# Patient Record
Sex: FEMALE | Race: WHITE | Employment: PART TIME | URBAN - METROPOLITAN AREA
[De-identification: names, ages, dates, MRNs, and addresses within clinical notes are randomized per-mention and may not be internally consistent; named-entity substitution may affect disease eponyms.]

---

## 2017-04-13 ENCOUNTER — OFFICE VISIT (OUTPATIENT)
Dept: FAMILY MEDICINE | Facility: CLINIC | Age: 62
End: 2017-04-13
Payer: COMMERCIAL

## 2017-04-13 VITALS
HEIGHT: 66 IN | SYSTOLIC BLOOD PRESSURE: 138 MMHG | TEMPERATURE: 98.8 F | WEIGHT: 127.2 LBS | BODY MASS INDEX: 20.44 KG/M2 | DIASTOLIC BLOOD PRESSURE: 88 MMHG | HEART RATE: 64 BPM

## 2017-04-13 DIAGNOSIS — Z11.59 NEED FOR HEPATITIS C SCREENING TEST: ICD-10-CM

## 2017-04-13 DIAGNOSIS — Z00.00 ROUTINE GENERAL MEDICAL EXAMINATION AT A HEALTH CARE FACILITY: Primary | ICD-10-CM

## 2017-04-13 DIAGNOSIS — Z12.11 COLON CANCER SCREENING: ICD-10-CM

## 2017-04-13 DIAGNOSIS — Z12.31 ENCOUNTER FOR SCREENING MAMMOGRAM FOR BREAST CANCER: ICD-10-CM

## 2017-04-13 DIAGNOSIS — R03.0 ELEVATED BLOOD PRESSURE READING WITHOUT DIAGNOSIS OF HYPERTENSION: ICD-10-CM

## 2017-04-13 LAB
ALBUMIN SERPL-MCNC: 4.1 G/DL (ref 3.4–5)
ALP SERPL-CCNC: 65 U/L (ref 40–150)
ALT SERPL W P-5'-P-CCNC: 19 U/L (ref 0–50)
ANION GAP SERPL CALCULATED.3IONS-SCNC: 7 MMOL/L (ref 3–14)
AST SERPL W P-5'-P-CCNC: 17 U/L (ref 0–45)
BILIRUB SERPL-MCNC: 0.5 MG/DL (ref 0.2–1.3)
BUN SERPL-MCNC: 17 MG/DL (ref 7–30)
CALCIUM SERPL-MCNC: 9 MG/DL (ref 8.5–10.1)
CHLORIDE SERPL-SCNC: 101 MMOL/L (ref 94–109)
CHOLEST SERPL-MCNC: 171 MG/DL
CO2 SERPL-SCNC: 30 MMOL/L (ref 20–32)
CREAT SERPL-MCNC: 0.85 MG/DL (ref 0.52–1.04)
ERYTHROCYTE [DISTWIDTH] IN BLOOD BY AUTOMATED COUNT: 12.4 % (ref 10–15)
GFR SERPL CREATININE-BSD FRML MDRD: 67 ML/MIN/1.7M2
GLUCOSE SERPL-MCNC: 101 MG/DL (ref 70–99)
HCT VFR BLD AUTO: 44.8 % (ref 35–47)
HDLC SERPL-MCNC: 88 MG/DL
HGB BLD-MCNC: 14.5 G/DL (ref 11.7–15.7)
LDLC SERPL CALC-MCNC: 68 MG/DL
MCH RBC QN AUTO: 30.3 PG (ref 26.5–33)
MCHC RBC AUTO-ENTMCNC: 32.4 G/DL (ref 31.5–36.5)
MCV RBC AUTO: 94 FL (ref 78–100)
NONHDLC SERPL-MCNC: 83 MG/DL
PLATELET # BLD AUTO: 259 10E9/L (ref 150–450)
POTASSIUM SERPL-SCNC: 4.1 MMOL/L (ref 3.4–5.3)
PROT SERPL-MCNC: 7.5 G/DL (ref 6.8–8.8)
RBC # BLD AUTO: 4.79 10E12/L (ref 3.8–5.2)
SODIUM SERPL-SCNC: 138 MMOL/L (ref 133–144)
TRIGL SERPL-MCNC: 75 MG/DL
TSH SERPL DL<=0.005 MIU/L-ACNC: 1.27 MU/L (ref 0.4–4)
WBC # BLD AUTO: 5.8 10E9/L (ref 4–11)

## 2017-04-13 PROCEDURE — 99386 PREV VISIT NEW AGE 40-64: CPT | Performed by: FAMILY MEDICINE

## 2017-04-13 PROCEDURE — 80053 COMPREHEN METABOLIC PANEL: CPT | Performed by: FAMILY MEDICINE

## 2017-04-13 PROCEDURE — 86803 HEPATITIS C AB TEST: CPT | Performed by: FAMILY MEDICINE

## 2017-04-13 PROCEDURE — 80061 LIPID PANEL: CPT | Performed by: FAMILY MEDICINE

## 2017-04-13 PROCEDURE — 36415 COLL VENOUS BLD VENIPUNCTURE: CPT | Performed by: FAMILY MEDICINE

## 2017-04-13 PROCEDURE — 85027 COMPLETE CBC AUTOMATED: CPT | Performed by: FAMILY MEDICINE

## 2017-04-13 PROCEDURE — 84443 ASSAY THYROID STIM HORMONE: CPT | Performed by: FAMILY MEDICINE

## 2017-04-13 NOTE — NURSING NOTE
"Chief Complaint   Patient presents with     Physical     Patient is fasting for labs today.        Initial BP (!) 142/96 (BP Location: Right arm, Patient Position: Chair, Cuff Size: Adult Regular)  Pulse 64  Temp 98.8  F (37.1  C) (Tympanic)  Ht 5' 6\" (1.676 m)  Wt 127 lb 3.2 oz (57.7 kg)  BMI 20.53 kg/m2 Estimated body mass index is 20.53 kg/(m^2) as calculated from the following:    Height as of this encounter: 5' 6\" (1.676 m).    Weight as of this encounter: 127 lb 3.2 oz (57.7 kg).  Medication Reconciliation: complete   Natalee Luna LPN  "

## 2017-04-13 NOTE — PROGRESS NOTES
SUBJECTIVE:     CC: Malorie Gill is an 62 year old woman who presents for preventive health visit.     Physical   Annual:     Getting at least 3 servings of Calcium per day::  Yes    Bi-annual eye exam::  Yes    Dental care twice a year::  Yes    Sleep apnea or symptoms of sleep apnea::  Daytime drowsiness    Diet::  Regular (no restrictions)    Frequency of exercise::  2-3 days/week    Duration of exercise::  15-30 minutes    Taking medications regularly::  Not Applicable    Medication side effects::  Not applicable    Concerns:  * Fasting labs    Today's PHQ-2 Score:   PHQ-2 ( 1999 [employer]) 4/13/2017   Q1: Little interest or pleasure in doing things 0   Q2: Feeling down, depressed or hopeless 0   PHQ-2 Score 0   Little interest or pleasure in doing things -   Feeling down, depressed or hopeless -   PHQ-2 Score -     Abuse: Current or Past(Physical, Sexual or Emotional)- No  Do you feel safe in your environment - Yes    Social History   Substance Use Topics     Smoking status: Never Smoker     Smokeless tobacco: Never Used     Alcohol use Yes      Comment: rare; 2-3 drinks per month or less     The patient does not drink >3 drinks per day nor >7 drinks per week.    Recent Labs   Lab Test  12/01/11   0831  03/09/09   1140   CHOL  158  153   HDL  57  70   LDL  85  65   TRIG  79  88   CHOLHDLRATIO  3.0  2.0       Reviewed orders with patient.  Reviewed health maintenance and updated orders accordingly - Yes    Mammo Decision Support:  Patient over age 50, mutual decision to screen reflected in health maintenance.    Pertinent mammograms are reviewed under the imaging tab.  History of abnormal Pap smear: NO - age 30-65 PAP every 5 years with negative HPV co-testing recommended    Reviewed and updated as needed this visit by clinical staff  Tobacco  Allergies  Meds  Med Hx  Surg Hx  Fam Hx  Soc Hx        Reviewed and updated as needed this visit by Provider          ROS:  C: NEGATIVE for fever, chills,  "change in weight  I: NEGATIVE for worrisome rashes, moles or lesions  E: NEGATIVE for vision changes or irritation  ENT: NEGATIVE for ear, mouth and throat problems  R: NEGATIVE for significant cough or SOB  B: NEGATIVE for masses, tenderness or discharge  CV: NEGATIVE for chest pain, palpitations or peripheral edema  GI: NEGATIVE for nausea, abdominal pain, heartburn, or change in bowel habits  : NEGATIVE for unusual urinary or vaginal symptoms. No vaginal bleeding.  M: NEGATIVE for significant arthralgias or myalgia  N: NEGATIVE for weakness, dizziness or paresthesias  P: NEGATIVE for changes in mood or affect     Problem list, Medication list, Allergies, and Medical/Social/Surgical histories reviewed in [facility] and updated as appropriate.  OBJECTIVE:     BP (!) 142/96 (BP Location: Right arm, Patient Position: Chair, Cuff Size: Adult Regular)  Pulse 64  Temp 98.8  F (37.1  C) (Tympanic)  Ht 5' 6\" (1.676 m)  Wt 127 lb 3.2 oz (57.7 kg)  BMI 20.53 kg/m2     EXAM:  GENERAL: healthy, alert and no distress  NECK: no adenopathy, no asymmetry, masses, or scars and thyroid normal to palpation  RESP: lungs clear to auscultation - no rales, rhonchi or wheezes  CV: regular rate and rhythm, normal S1 S2, no S3 or S4, no murmur, click or rub, no peripheral edema and peripheral pulses strong  ABDOMEN: soft, nontender, no hepatosplenomegaly, no masses and bowel sounds normal  MS: no gross musculoskeletal defects noted, no edema    ASSESSMENT/PLAN:     1. Routine general medical examination at a health care facility    - Hepatitis C Screen Reflex to HCV RNA Quant and Genotype  - GASTROENTEROLOGY ADULT REF PROCEDURE ONLY  - LIPID REFLEX TO DIRECT LDL PANEL  - TSH with free T4 reflex  - Comprehensive metabolic panel  - CBC with platelets  - MA Screen Bilateral w/Earnest; Future    2. Colon cancer screening    - GASTROENTEROLOGY ADULT REF PROCEDURE ONLY    3. Need for hepatitis C screening test  - Hepatitis C Screen Reflex to HCV " "RNA Quant and Genotype    4. Elevated blood pressure reading without diagnosis of hypertension    - TSH with free T4 reflex  - Comprehensive metabolic panel  - CBC with platelets    5. Encounter for screening mammogram for breast cancer  - MA Screen Bilateral w/Earnest; Future    COUNSELING:  Reviewed preventive health counseling, as reflected in patient instructions       Regular exercise       Healthy diet/nutrition       Vision screening       Hearing screening       Colon cancer screening       Consider Hep C screening for patients born between 1945 and 1965       Advance Care Planning     reports that she has never smoked. She has never used smokeless tobacco.    Estimated body mass index is 20.53 kg/(m^2) as calculated from the following:    Height as of this encounter: 5' 6\" (1.676 m).    Weight as of this encounter: 127 lb 3.2 oz (57.7 kg).       Counseling Resources:  ATP IV Guidelines  Pooled Cohorts Equation Calculator  Breast Cancer Risk Calculator  FRAX Risk Assessment  ICSI Preventive Guidelines  Dietary Guidelines for Americans, 2010  USDA's MyPlate  ASA Prophylaxis  Lung CA Screening    Jeramy Conti MD  [facility]  "

## 2017-04-13 NOTE — MR AVS SNAPSHOT
After Visit Summary   4/13/2017    Malorie Gill    MRN: [id number]           Patient Information     Date Of Birth          1955        Visit Information        Provider Department      4/13/2017 10:00 AM [facility]        Today's Diagnoses     Routine general medical examination at a health care facility    -  1    Colon cancer screening        Need for hepatitis C screening test        Elevated blood pressure reading without diagnosis of hypertension        Encounter for screening mammogram for breast cancer          Care Instructions      Preventive Health Recommendations  Female Ages 50 - 64    Yearly exam: See your health care provider every year in order to  o Review health changes.   o Discuss preventive care.    o Review your medicines if your doctor has prescribed any.      Get a Pap test every three years (unless you have an abnormal result and your provider advises testing more often).    If you get Pap tests with HPV test, you only need to test every 5 years, unless you have an abnormal result.     You do not need a Pap test if your uterus was removed (hysterectomy) and you have not had cancer.    You should be tested each year for STDs (sexually transmitted diseases) if you're at risk.     Have a mammogram every 1 to 2 years.    Have a colonoscopy at age 50, or have a yearly FIT test (stool test). These exams screen for colon cancer.      Have a cholesterol test every 5 years, or more often if advised.    Have a diabetes test (fasting glucose) every three years. If you are at risk for diabetes, you should have this test more often.     If you are at risk for osteoporosis (brittle bone disease), think about having a bone density scan (DEXA).    Shots: Get a flu shot each year. Get a tetanus shot every 10 years.    Nutrition:     Eat at least 5 servings of fruits and vegetables each day.    Eat whole-grain bread, whole-wheat pasta and brown rice  instead of white grains and rice.    Talk to your provider about Calcium and Vitamin D.     Lifestyle    Exercise at least 150 minutes a week (30 minutes a day, 5 days a week). This will help you control your weight and prevent disease.    Limit alcohol to one drink per day.    No smoking.     Wear sunscreen to prevent skin cancer.     See your dentist every six months for an exam and cleaning.    See your eye doctor every 1 to 2 years.          Follow-ups after your visit        Additional Services     GASTROENTEROLOGY ADULT REF PROCEDURE ONLY                 Your next 10 appointments already scheduled     Apr 28, 2017   Procedure with David Garcia MD   [facility] ([facility])    [address] [address]   [phone]           The medical center is located at [address] (between [address] in [place], [place] miles north of [place]).              Who to contact     Normal or non-critical lab and imaging results will be communicated to you by [employer], letter or phone within 4 business days after the clinic has received the results. If you do not hear from us within 7 days, please contact the clinic through [employer] or phone. If you have a critical or abnormal lab result, we will notify you by phone as soon as possible.  Submit refill requests through [email or web address] or call your pharmacy and they will forward the refill request to us. Please allow 3 business days for your refill to be completed.          If you need to speak with a  for additional information , please call: [phone]             Additional Information About Your Visit        [email or web address] Information     [email or web address] gives you secure access to your electronic health record. If you see a primary care provider, you can also send messages to your care team and make appointments. If you have questions, please call your primary care clinic.  If you do not have a primary care provider,  "please call [phone] and they will assist you.        Care EveryWhere ID     This is your Care EveryWhere ID. This could be used by other organizations to access your [facility] medical records  [id number]        Your Vitals Were     Pulse Temperature Height BMI (Body Mass Index)          64 98.8  F (37.1  C) (Tympanic) 5' 6\" (1.676 m) 20.53 kg/m2         Blood Pressure from Last 3 Encounters:   04/13/17 138/88   12/18/13 130/90   12/09/13 139/84    Weight from Last 3 Encounters:   04/13/17 127 lb 3.2 oz (57.7 kg)   12/18/13 132 lb 6.4 oz (60.1 kg)   12/09/13 129 lb 4.8 oz (58.7 kg)              We Performed the Following     CBC with platelets     Comprehensive metabolic panel     GASTROENTEROLOGY ADULT REF PROCEDURE ONLY     Hepatitis C Screen Reflex to HCV RNA Quant and Genotype     LIPID REFLEX TO DIRECT LDL PANEL     TSH with free T4 reflex        Primary Care Provider Office Phone # Fax #    Jeramy Conti [phone]       [facility] [address] [address] [address]        Thank you!     Thank you for choosing [facility]  for your care. Our goal is always to provide you with excellent care. Hearing back from our patients is one way we can continue to improve our services. Please take a few minutes to complete the written survey that you may receive in the mail after your visit with us. Thank you!             Your Updated Medication List - Protect others around you: Learn how to safely use, store and throw away your medicines at www.disposemymeds.org.          This list is accurate as of: 4/13/17 11:59 PM.  Always use your most recent med list.                   Brand Name Dispense Instructions for use    NO ACTIVE MEDICATIONS      .         "

## 2017-04-14 LAB — HCV AB SERPL QL IA: NORMAL

## 2017-04-23 ENCOUNTER — ANESTHESIA EVENT (OUTPATIENT)
Dept: GASTROENTEROLOGY | Facility: CLINIC | Age: 62
End: 2017-04-23
Payer: COMMERCIAL

## 2017-04-23 NOTE — ANESTHESIA PREPROCEDURE EVALUATION
Anesthesia Evaluation     . Pt has had prior anesthetic. Type: General    No history of anesthetic complications          ROS/MED HX    ENT/Pulmonary:  - neg pulmonary ROS     Neurologic:  - neg neurologic ROS     Cardiovascular:  - neg cardiovascular ROS       METS/Exercise Tolerance:  >4 METS   Hematologic:  - neg hematologic  ROS       Musculoskeletal:  - neg musculoskeletal ROS       GI/Hepatic:  - neg GI/hepatic ROS       Renal/Genitourinary:  - ROS Renal section negative       Endo:  - neg endo ROS       Psychiatric:  - neg psychiatric ROS       Infectious Disease:  - neg infectious disease ROS       Malignancy:      - no malignancy   Other:    - neg other ROS                 Physical Exam  Normal systems: cardiovascular, pulmonary and dental    Airway   Mallampati: II  TM distance: >3 FB  Neck ROM: full    Dental     Cardiovascular   Rhythm and rate: regular and normal      Pulmonary    breath sounds clear to auscultation                    Anesthesia Plan      History & Physical Review  History and physical reviewed and following examination; no interval change.    ASA Status:  1 .    NPO Status:  > 8 hours    Plan for General with Propofol induction. Maintenance will be TIVA.           Postoperative Care      Consents  Anesthetic plan, risks, benefits and alternatives discussed with:  Patient..                          .

## 2017-04-28 ENCOUNTER — HOSPITAL ENCOUNTER (OUTPATIENT)
Facility: CLINIC | Age: 62
Discharge: HOME | End: 2017-04-28
Attending: SURGERY | Admitting: SURGERY
Payer: COMMERCIAL

## 2017-04-28 ENCOUNTER — ANESTHESIA (OUTPATIENT)
Dept: GASTROENTEROLOGY | Facility: CLINIC | Age: 62
End: 2017-04-28
Payer: COMMERCIAL

## 2017-04-28 ENCOUNTER — SURGERY (OUTPATIENT)
Age: 62
End: 2017-04-28

## 2017-04-28 VITALS
SYSTOLIC BLOOD PRESSURE: 124 MMHG | HEIGHT: 66 IN | WEIGHT: 127 LBS | RESPIRATION RATE: 16 BRPM | DIASTOLIC BLOOD PRESSURE: 82 MMHG | TEMPERATURE: 98.2 F | BODY MASS INDEX: 20.41 KG/M2 | OXYGEN SATURATION: 97 % | HEART RATE: 64 BPM

## 2017-04-28 LAB — COLONOSCOPY: NORMAL

## 2017-04-28 PROCEDURE — 88305 TISSUE EXAM BY PATHOLOGIST: CPT | Performed by: SURGERY

## 2017-04-28 PROCEDURE — 45385 COLONOSCOPY W/LESION REMOVAL: CPT | Performed by: SURGERY

## 2017-04-28 PROCEDURE — 37000008 ZZH ANESTHESIA TECHNICAL FEE, 1ST 30 MIN: Performed by: SURGERY

## 2017-04-28 PROCEDURE — 25000125 ZZHC RX 250: Performed by: SURGERY

## 2017-04-28 PROCEDURE — 25000125 ZZHC RX 250: Performed by: NURSE ANESTHETIST, CERTIFIED REGISTERED

## 2017-04-28 PROCEDURE — 25800025 ZZH RX 258: Performed by: SURGERY

## 2017-04-28 PROCEDURE — 88305 TISSUE EXAM BY PATHOLOGIST: CPT | Mod: 26 | Performed by: SURGERY

## 2017-04-28 PROCEDURE — 45385 COLONOSCOPY W/LESION REMOVAL: CPT | Mod: PT | Performed by: SURGERY

## 2017-04-28 RX ORDER — SODIUM CHLORIDE, SODIUM LACTATE, POTASSIUM CHLORIDE, CALCIUM CHLORIDE 600; 310; 30; 20 MG/100ML; MG/100ML; MG/100ML; MG/100ML
INJECTION, SOLUTION INTRAVENOUS CONTINUOUS
Status: DISCONTINUED | OUTPATIENT
Start: 2017-04-28 | End: 2017-04-28 | Stop reason: HOSPADM

## 2017-04-28 RX ORDER — PROPOFOL 10 MG/ML
INJECTION, EMULSION INTRAVENOUS CONTINUOUS PRN
Status: DISCONTINUED | OUTPATIENT
Start: 2017-04-28 | End: 2017-04-28

## 2017-04-28 RX ORDER — LIDOCAINE HYDROCHLORIDE 10 MG/ML
INJECTION, SOLUTION INFILTRATION; PERINEURAL PRN
Status: DISCONTINUED | OUTPATIENT
Start: 2017-04-28 | End: 2017-04-28

## 2017-04-28 RX ORDER — PROPOFOL 10 MG/ML
INJECTION, EMULSION INTRAVENOUS PRN
Status: DISCONTINUED | OUTPATIENT
Start: 2017-04-28 | End: 2017-04-28

## 2017-04-28 RX ORDER — LIDOCAINE 40 MG/G
CREAM TOPICAL
Status: DISCONTINUED | OUTPATIENT
Start: 2017-04-28 | End: 2017-04-28 | Stop reason: HOSPADM

## 2017-04-28 RX ORDER — ONDANSETRON 2 MG/ML
4 INJECTION INTRAMUSCULAR; INTRAVENOUS
Status: DISCONTINUED | OUTPATIENT
Start: 2017-04-28 | End: 2017-04-28 | Stop reason: HOSPADM

## 2017-04-28 RX ORDER — GLYCOPYRROLATE 0.2 MG/ML
INJECTION, SOLUTION INTRAMUSCULAR; INTRAVENOUS PRN
Status: DISCONTINUED | OUTPATIENT
Start: 2017-04-28 | End: 2017-04-28

## 2017-04-28 RX ADMIN — PROPOFOL 200 MCG/KG/MIN: 10 INJECTION, EMULSION INTRAVENOUS at 08:46

## 2017-04-28 RX ADMIN — PROPOFOL 30 MG: 10 INJECTION, EMULSION INTRAVENOUS at 08:59

## 2017-04-28 RX ADMIN — SODIUM CHLORIDE, POTASSIUM CHLORIDE, SODIUM LACTATE AND CALCIUM CHLORIDE: 600; 310; 30; 20 INJECTION, SOLUTION INTRAVENOUS at 07:51

## 2017-04-28 RX ADMIN — LIDOCAINE HYDROCHLORIDE 50 MG: 10 INJECTION, SOLUTION INFILTRATION; PERINEURAL at 08:47

## 2017-04-28 RX ADMIN — GLYCOPYRROLATE 0.2 MG: 0.2 INJECTION, SOLUTION INTRAMUSCULAR; INTRAVENOUS at 08:46

## 2017-04-28 RX ADMIN — PROPOFOL 40 MG: 10 INJECTION, EMULSION INTRAVENOUS at 08:47

## 2017-04-28 RX ADMIN — LIDOCAINE HYDROCHLORIDE 1 ML: 10 INJECTION, SOLUTION INFILTRATION; PERINEURAL at 07:51

## 2017-04-28 NOTE — ANESTHESIA POSTPROCEDURE EVALUATION
Patient: Malorie Gill    Procedure(s):  Colonoscopy with polypectomy     - Wound Class: II-Clean Contaminated    Diagnosis:screening  Diagnosis Additional Information: No value filed.    Anesthesia Type:  General    Note:  Anesthesia Post Evaluation    Patient location during evaluation: Phase 2  Patient participation: Able to fully participate in evaluation  Level of consciousness: awake and alert  Pain management: adequate  Airway patency: patent  Cardiovascular status: acceptable  Respiratory status: acceptable  Hydration status: acceptable  PONV: none     Anesthetic complications: None          Last vitals:  Vitals:    04/28/17 0733 04/28/17 0911   BP: 121/70 118/87   Pulse:  82   Resp: 16 16   Temp: 36.8  C (98.2  F)    SpO2: 99% 98%         Electronically Signed By: Yony Velez CRNA, APRN CRNA  April 28, 2017  9:21 AM

## 2017-04-28 NOTE — BRIEF OP NOTE
[facility]   Brief Operative Note    Pre-operative diagnosis: screening   Post-operative diagnosis mild diverticulosis, single polyp   Procedure: Procedure(s):  Colonoscopy with polypectomy     - Wound Class: II-Clean Contaminated   Surgeon(s): Surgeon(s) and Role:     * David Garcia MD - Primary   Estimated blood loss: * No values recorded between 4/28/2017 12:00 AM and 4/28/2017  9:10 AM *    Specimens:   ID Type Source Tests Collected by Time Destination   A :  Polyp Large Intestine, Hepatic Flexure SURGICAL PATHOLOGY EXAM David Garcia MD 4/28/2017  9:04 AM       Findings: 1.  Single polyp at hepatic flexure.  2.  Scattered diverticulosis  3.  Colon otherwise normal

## 2017-04-28 NOTE — H&P
62 year old year old female here for colonoscopy for screening.    Patient Active Problem List   Diagnosis     CARDIOVASCULAR SCREENING; LDL GOAL LESS THAN 160     Advanced directives, counseling/discussion     Health Care Home       Past Medical History:   Diagnosis Date     Adnexal mass 9/97    complex LEFT adnexal mass     Other and unspecified ovarian cyst        Past Surgical History:   Procedure Laterality Date     SALPINGO OOPHORECTOMY,R/L/SHANNON  2000    Laparoscopic LEFT salping-oophorectomy     TUBAL LIGATION  2000    RIGHT [facility]       [facility]    No current outpatient prescriptions on file.       No Known Allergies    Pt reports that she has never smoked. She has never used smokeless tobacco. She reports that she drinks alcohol. She reports that she does not use illicit drugs.    Exam:  There were no vitals taken for this visit.    Awake, Alert OX3  Lungs - CTA bilaterally  CV - RRR, no murmurs, distal pulses intact  Abd - soft, non-distended, non-tender, +BS  Extr - No cyanosis or edema    A/P 62 year old year old female in need of colonoscopy for screening. Risks, benefits, alternatives, and complications were discussed including the possibility of perforation and the patient agreed to proceed    David Garcia MD

## 2017-04-28 NOTE — ANESTHESIA CARE TRANSFER NOTE
Patient: Malorie Gill    Procedure(s):  Colonoscopy with polypectomy     - Wound Class: II-Clean Contaminated    Diagnosis: screening  Diagnosis Additional Information: No value filed.    Anesthesia Type:   General     Note:  Airway :Nasal Cannula  Patient transferred to:Phase II        Vitals: (Last set prior to Anesthesia Care Transfer)    CRNA VITALS  4/28/2017 0839 - 4/28/2017 0909      4/28/2017             Pulse: 85    SpO2: 99 %                Electronically Signed By: Yony Velez CRNA, APRN CRNA  April 28, 2017  9:09 AM

## 2017-05-01 LAB — COPATH REPORT: NORMAL

## 2017-07-08 ENCOUNTER — HEALTH MAINTENANCE LETTER (OUTPATIENT)
Age: 62
End: 2017-07-08

## 2017-09-08 ENCOUNTER — TELEPHONE (OUTPATIENT)
Dept: FAMILY MEDICINE | Facility: CLINIC | Age: 62
End: 2017-09-08

## 2017-09-08 NOTE — TELEPHONE ENCOUNTER
Panel Management Review      Patient has the following on her problem list: None      Composite cancer screening  Chart review shows that this patient is due/due soon for the following Pap Smear and Mammogram  Summary:    Patient is due/failing the following:   MAMMOGRAM, PAP and PHYSICAL    Action needed:   Patient needs office visit for a physical and Patient needs referral/order: mammogram    Type of outreach:    Phone, spoke to patient.  Denied at this time    Questions for provider review:    None                                                                                                                                    Amy Fletcher CMA     Chart routed to self.

## 2017-09-21 ENCOUNTER — HOSPITAL ENCOUNTER (EMERGENCY)
Facility: CLINIC | Age: 62
End: 2017-09-21
Payer: COMMERCIAL

## 2017-09-21 ENCOUNTER — APPOINTMENT (OUTPATIENT)
Dept: GENERAL RADIOLOGY | Facility: CLINIC | Age: 62
End: 2017-09-21
Attending: PHYSICIAN ASSISTANT
Payer: OTHER MISCELLANEOUS

## 2017-09-21 ENCOUNTER — HOSPITAL ENCOUNTER (EMERGENCY)
Facility: CLINIC | Age: 62
Discharge: HOME | End: 2017-09-21
Attending: PHYSICIAN ASSISTANT | Admitting: PHYSICIAN ASSISTANT
Payer: OTHER MISCELLANEOUS

## 2017-09-21 VITALS
DIASTOLIC BLOOD PRESSURE: 76 MMHG | SYSTOLIC BLOOD PRESSURE: 118 MMHG | WEIGHT: 130 LBS | HEIGHT: 66 IN | OXYGEN SATURATION: 99 % | HEART RATE: 82 BPM | TEMPERATURE: 98.3 F | RESPIRATION RATE: 20 BRPM | BODY MASS INDEX: 20.89 KG/M2

## 2017-09-21 DIAGNOSIS — M25.512 ACUTE PAIN OF LEFT SHOULDER: ICD-10-CM

## 2017-09-21 DIAGNOSIS — M25.561 ACUTE PAIN OF RIGHT KNEE: ICD-10-CM
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
## 2017-09-21 DIAGNOSIS — M25.521 RIGHT ELBOW PAIN: ICD-10-CM

## 2017-09-21 PROCEDURE — 99213 OFFICE O/P EST LOW 20 MIN: CPT | Performed by: PHYSICIAN ASSISTANT

## 2017-09-21 PROCEDURE — 73560 X-RAY EXAM OF KNEE 1 OR 2: CPT | Mod: RT

## 2017-09-21 PROCEDURE — 73030 X-RAY EXAM OF SHOULDER: CPT | Mod: LT

## 2017-09-21 PROCEDURE — 99213 OFFICE O/P EST LOW 20 MIN: CPT

## 2017-09-21 ASSESSMENT — ENCOUNTER SYMPTOMS
NEUROLOGICAL NEGATIVE: 1
CONSTITUTIONAL NEGATIVE: 1

## 2017-09-21 NOTE — ED AVS SNAPSHOT
[facility]    [address] [address]    Phone:  [phone]    Fax:  [phone]                                       Malorie Gill   MRN: [id number]    Department:  [facility]   Date of Visit:  9/21/2017           After Visit Summary Signature Page     I have received my discharge instructions, and my questions have been answered. I have discussed any challenges I see with this plan with the nurse or doctor.    ..........................................................................................................................................  Patient/Patient Representative Signature      ..........................................................................................................................................  Patient Representative Print Name and Relationship to Patient    ..................................................               ................................................  Date                                            Time    ..........................................................................................................................................  Reviewed by Signature/Title    ...................................................              ..............................................  Date                                                            Time

## 2017-09-21 NOTE — ED PROVIDER NOTES
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  She states she started hopping in order to try and get her foot free while instead twisted her right knee and fell on an outstretched right arm or her left arm was bracing herself on the patient bed.  She has since had pain in her right knee especially with weightbearing.  She describes a popping in this knee since the fall.  Patient notes she has a history of an injury in this knee in which she first initially developed this popping sensation and has not had any issues with it since she saw the chiropractor.  Patient has also had pain in her left shoulder and into the left side of her neck.  Patient also complains of right elbow pain.  She notes that her pain feels mostly muscular in origin and she does not feel as if she broke anything.  She states she wanted to be evaluated right away so that she can best care for her injuries in case she tore something.    I have reviewed the Medications, Allergies, Past Medical and Surgical History, and Social History in the Epic system.    Allergies:   Allergies   Allergen Reactions     Nka [No Known Allergies]          No current facility-administered medications on file prior to encounter.   Current Outpatient Prescriptions on File Prior to Encounter:  NO ACTIVE MEDICATIONS .       Patient Active Problem List   Diagnosis     CARDIOVASCULAR SCREENING; LDL GOAL LESS THAN 160     Advanced directives, counseling/discussion     Health Care Home       Past Surgical History:   Procedure Laterality Date     SALPINGO OOPHORECTOMY,R/L/SHANNON   "2000    Laparoscopic LEFT salping-oophorectomy     TUBAL LIGATION  2000    RIGHT tubal ligation       Social History   Substance Use Topics     Smoking status: Never Smoker     Smokeless tobacco: Never Used     Alcohol use Yes      Comment: rare; 2-3 drinks per month or less         There is no immunization history on file for this patient.    BMI: Estimated body mass index is 20.98 kg/(m^2) as calculated from the following:    Height as of this encounter: 1.676 m (5' 6\").    Weight as of this encounter: 59 kg (130 lb).      Review of Systems   Constitutional: Negative.    Musculoskeletal:        Left shoulder and right elbow and knee pain   Skin: Negative.    Neurological: Negative.    All other systems reviewed and are negative.      Physical Exam   BP: 118/76  Pulse: 82  Heart Rate: 82  Temp: 98.3  F (36.8  C)  Resp: 20  Height: 167.6 cm (5' 6\")  Weight: 59 kg (130 lb)  SpO2: 99 %  Physical Exam   Constitutional: She appears well-developed and well-nourished. No distress.   HENT:   Head: Normocephalic and atraumatic.   Eyes: Conjunctivae are normal.   Neck: Normal range of motion and full passive range of motion without pain. Neck supple. No spinous process tenderness and no muscular tenderness present. Normal range of motion present.   Pulmonary/Chest: Effort normal.   Musculoskeletal:        Left shoulder: She exhibits tenderness (along anterior joint). She exhibits normal range of motion, no swelling, no effusion, no crepitus, no deformity, no laceration, no spasm, normal pulse and normal strength.        Right elbow: She exhibits normal range of motion, no swelling, no effusion, no deformity and no laceration. Tenderness found. Radial head, medial epicondyle and lateral epicondyle tenderness noted.        Left elbow: Normal.        Right wrist: Normal.        Left wrist: Normal.        Right knee: She exhibits normal range of motion, no swelling, no effusion, no ecchymosis, no deformity, no laceration and no " erythema. Tenderness found. Lateral joint line and LCL tenderness noted. No medial joint line tenderness noted.        Cervical back: She exhibits normal range of motion, no tenderness and no bony tenderness.        Right hand: Normal.   Neurological: She is alert.   Skin: Skin is warm and dry.       ED Course     ED Course     Procedures    Results for orders placed or performed during the hospital encounter of 09/21/17   Shoulder XR, 2 view left    Narrative    SHOULDER TWO VIEWS LEFT 9/21/2017 6:09 PM     COMPARISON: None    HISTORY: Fall, anterior joint tenderness    FINDINGS: The visualized bones and joint spaces are within normal  limits.      Impression    IMPRESSION: No evidence for fracture, dislocation or significant  degenerative change of the left shoulder.   Knee XR, 2 view, right    Narrative    KNEE RIGHT ONE TO TWO VIEWS  9/21/2017 6:08 PM     COMPARISON: Two-view right knee 8/5/2005.    HISTORY: Fall, tenderness to lateral joint line.      Impression    IMPRESSION: There is marginal osteophytic spur formation in all three  compartments of the right knee. The degenerative changes in the  lateral compartment have worsened since the comparison study. There is  no evidence for fracture or dislocation.       Assessments & Plan (with Medical Decision Making)     Pt is a 62 year old female who presents for evaluation after a fall while at work around 11:30 this morning.  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  She states she started hopping in order to try and get her foot free while instead twisted her right knee and fell on an outstretched right arm or her left arm was bracing herself on the patient bed.  She has since had pain in her right knee especially with weightbearing.  She describes a popping in this knee since the fall.  Patient notes she has a history of an injury in this knee  in which she first initially developed this popping sensation and has not had any issues with it since she saw the chiropractor.  Patient has also had pain in her left shoulder and into the left side of her neck.  Patient also complains of right elbow pain.  She notes that her pain feels mostly muscular in origin and she does not feel as if she broke anything.  Pt is afebrile on arrival.  Exam as above.  X-rays of left shoulder and right knee were negative for fracture or acute pathology.  I also recommended we obtain an x-ray of her right elbow to rule-out a radial head fracture especially given her fall on outstretched arm, but patient is declining this at this time.  Discussed results with patient.  Suspect possible meniscal injury to right knee.  Encouraged rest, ice, compression, and elevation as well as NSAID use for pain and inflammation.  Hand-outs provided.    Patient was instructed to follow-up with orthopedics if no improvement in a week for continued care and management or sooner if new or worsening symptoms.  She is to return to the ED for persistent and/or worsening symptoms.  Patient expressed understanding of the diagnosis and plan and was discharged home in good condition.    I have reviewed the nursing notes.    I have reviewed the findings, diagnosis, plan and need for follow up with the patient.    Discharge Medication List as of 9/21/2017  6:26 PM          Final diagnoses:   Fall, initial encounter   Acute pain of left shoulder   Acute pain of right knee   Right elbow pain       9/21/2017   [facility] EMERGENCY DEPARTMENT     Yesenia Guerra PA-C  09/21/17 1944

## 2017-09-21 NOTE — ED AVS SNAPSHOT
[facility] Emergency Department    [address] [address]    Phone:  [phone]    Fax:  [phone]                                       Malorie Gill   MRN: [id number]    Department:  [facility] Emergency Department   Date of Visit:  9/21/2017           Patient Information     Date Of Birth          1955        Your diagnoses for this visit were:     Fall, initial encounter     Acute pain of left shoulder     Acute pain of right knee     Right elbow pain        You were seen by Yesenia Guerra PA-C.      Follow-up Information     Follow up with [facility] [place]. Call in 1 week.    Specialty:  Orthopedics    Why:  As needed, For follow-up    Contact information:    [address]  [address]  [place] [address] [phone]        Follow up with [facility] Emergency Department.    Specialty:  EMERGENCY MEDICINE    Why:  As needed, If symptoms worsen    Contact information:    [address] [address] [phone]    Additional information:    The medical center is located at   [address] ([address] in [place], [place] north   of [place]).      Discharge References/Attachments     RICE (ENGLISH)      24 Hour Appointment Hotline       To make an appointment at any [facility] clinic, call [phone] ([phone]). If you don't have a family doctor or clinic, we will help you find one. [facility] clinics are conveniently located to serve the needs of you and your family.             Review of your medicines      Our records show that you are taking the medicines listed below. If these are incorrect, please call your family doctor or clinic.        Dose / Directions Last dose taken    NO ACTIVE MEDICATIONS        .   Refills:  0                Procedures and tests performed during your visit     Knee XR, 2 view, right    Shoulder XR, 2 view left      Orders Needing  Specimen Collection     None      Pending Results     Date and Time Order Name Status Description    9/21/2017 1748 Knee XR, 2 view, right Preliminary     9/21/2017 1748 Shoulder XR, 2 view left Preliminary             Pending Culture Results     No orders found from 9/19/2017 to 9/22/2017.            Pending Results Instructions     If you had any lab results that were not finalized at the time of your Discharge, you can call the ED Lab Result RN at [phone]. You will be contacted by this team for any positive Lab results or changes in treatment. The nurses are available 7 days a week from 10A to 6:30P.  You can leave a message 24 hours per day and they will return your call.        Test Results From Your Hospital Stay        9/21/2017  6:14 PM      Narrative     SHOULDER TWO VIEWS LEFT 9/21/2017 6:09 PM     COMPARISON: None    HISTORY: Fall, anterior joint tenderness    FINDINGS: The visualized bones and joint spaces are within normal  limits.        Impression     IMPRESSION: No evidence for fracture, dislocation or significant  degenerative change of the left shoulder.         9/21/2017  6:13 PM      Narrative     KNEE RIGHT ONE TO TWO VIEWS  9/21/2017 6:08 PM     COMPARISON: Two-view right knee 8/5/2005.    HISTORY: Fall, tenderness to lateral joint line.        Impression     IMPRESSION: There is marginal osteophytic spur formation in all three  compartments of the right knee. The degenerative changes in the  lateral compartment have worsened since the comparison study. There is  no evidence for fracture or dislocation.                Thank you for choosing [facility]       Thank you for choosing [facility] for your care. Our goal is always to provide you with excellent care. Hearing back from our patients is one way we can continue to improve our services. Please take a few minutes to complete the written survey that you may receive in the mail after you visit with us. Thank you!        MyChart Information      [employer] gives you secure access to your electronic health record. If you see a primary care provider, you can also send messages to your care team and make appointments. If you have questions, please call your primary care clinic.  If you do not have a primary care provider, please call [phone] and they will assist you.        Care EveryWhere ID     This is your Care EveryWhere ID. This could be used by other organizations to access your [place] medical records  [id number]        Equal Access to Services     DESTIN FAIRCHILD : Hadii carol knighto Soanastasiya, waaxda luqadaha, qaybta kaalmada adeegyaalverto, suzie amaya. So [facility] [phone].    ATENCIÓN: Si habla español, tiene a márquez disposición servicios gratuitos de asistencia lingüística. Llame al [phone].    We comply with applicable federal civil rights laws and [place] laws. We do not discriminate on the basis of race, color, national origin, age, disability sex, sexual orientation or gender identity.            After Visit Summary       This is your record. Keep this with you and show to your community pharmacist(s) and doctor(s) at your next visit.

## 2017-11-29 ENCOUNTER — OFFICE VISIT (OUTPATIENT)
Dept: OBGYN | Facility: CLINIC | Age: 62
End: 2017-11-29
Payer: COMMERCIAL

## 2017-11-29 VITALS
DIASTOLIC BLOOD PRESSURE: 70 MMHG | WEIGHT: 134 LBS | BODY MASS INDEX: 21.53 KG/M2 | HEART RATE: 84 BPM | HEIGHT: 66 IN | SYSTOLIC BLOOD PRESSURE: 126 MMHG

## 2017-11-29 DIAGNOSIS — Z12.4 SCREENING FOR MALIGNANT NEOPLASM OF CERVIX: ICD-10-CM

## 2017-11-29 DIAGNOSIS — Z01.411 ENCOUNTER FOR GYNECOLOGICAL EXAMINATION WITH ABNORMAL FINDING: Primary | ICD-10-CM

## 2017-11-29 DIAGNOSIS — N81.2 UTEROVAGINAL PROLAPSE, INCOMPLETE: ICD-10-CM

## 2017-11-29 DIAGNOSIS — N63.0 BREAST MASS: ICD-10-CM

## 2017-11-29 PROCEDURE — 99386 PREV VISIT NEW AGE 40-64: CPT | Performed by: OBSTETRICS & GYNECOLOGY

## 2017-11-29 PROCEDURE — G0145 SCR C/V CYTO,THINLAYER,RESCR: HCPCS | Performed by: OBSTETRICS & GYNECOLOGY

## 2017-11-29 PROCEDURE — 87624 HPV HI-RISK TYP POOLED RSLT: CPT | Performed by: OBSTETRICS & GYNECOLOGY

## 2017-11-29 NOTE — MR AVS SNAPSHOT
After Visit Summary   11/29/2017    Malorie Gill    MRN: [id number]           Patient Information     Date Of Birth          1955        Visit Information        Provider Department      11/29/2017 1:00 PM Jocelynn Pham MD [facility]        Today's Diagnoses     Encounter for gynecological examination with abnormal finding    -  1    Screening for malignant neoplasm of cervix        Uterovaginal prolapse, incomplete        Breast mass          Care Instructions      Preventive Health Recommendations  Female Ages 50 - 64    Yearly exam: See your health care provider every year in order to  o Review health changes.   o Discuss preventive care.    o Review your medicines if your doctor has prescribed any.      Get a Pap test every three years (unless you have an abnormal result and your provider advises testing more often).    If you get Pap tests with HPV test, you only need to test every 5 years, unless you have an abnormal result.     You do not need a Pap test if your uterus was removed (hysterectomy) and you have not had cancer.    You should be tested each year for STDs (sexually transmitted diseases) if you're at risk.     Have a mammogram every 1 to 2 years.    Have a colonoscopy at age 50, or have a yearly FIT test (stool test). These exams screen for colon cancer.      Have a cholesterol test every 5 years, or more often if advised.    Have a diabetes test (fasting glucose) every three years. If you are at risk for diabetes, you should have this test more often.     If you are at risk for osteoporosis (brittle bone disease), think about having a bone density scan (DEXA).    Shots: Get a flu shot each year. Get a tetanus shot every 10 years.    Nutrition:     Eat at least 5 servings of fruits and vegetables each day.    Eat whole-grain bread, whole-wheat pasta and brown rice instead of white grains and rice.    Talk to your provider about Calcium and Vitamin D.  "    Lifestyle    Exercise at least 150 minutes a week (30 minutes a day, 5 days a week). This will help you control your weight and prevent disease.    Limit alcohol to one drink per day.    No smoking.     Wear sunscreen to prevent skin cancer.     See your dentist every six months for an exam and cleaning.    See your eye doctor every 1 to 2 years.            Follow-ups after your visit        Who to contact     If you have questions or need follow up information about today's clinic visit or your schedule please contact [facility] directly at [phone].  Normal or non-critical lab and imaging results will be communicated to you by [employer], letter or phone within 4 business days after the clinic has received the results. If you do not hear from us within 7 days, please contact the clinic through [employer] or phone. If you have a critical or abnormal lab result, we will notify you by phone as soon as possible.  Submit refill requests through [employer] or call your pharmacy and they will forward the refill request to us. Please allow 3 business days for your refill to be completed.          Additional Information About Your Visit        [employer] Information     [employer] gives you secure access to your electronic health record. If you see a primary care provider, you can also send messages to your care team and make appointments. If you have questions, please call your primary care clinic.  If you do not have a primary care provider, please call [phone] and they will assist you.        Care EveryWhere ID     This is your Care EveryWhere ID. This could be used by other organizations to access your [facility] medical records  [id number]        Your Vitals Were     Pulse Height BMI (Body Mass Index)             84 5' 6\" (1.676 m) 21.63 kg/m2          Blood Pressure from Last 3 Encounters:   11/29/17 126/70   09/21/17 118/76   04/28/17 124/82    Weight from Last 3 Encounters:   11/29/17 134 lb (60.8 kg) "   09/21/17 130 lb (59 kg)   04/28/17 127 lb (57.6 kg)              We Performed the Following     HPV High Risk Types DNA Cervical     Pap imaged thin layer screen with HPV - recommended age 30 - 65 years (select HPV order below)        Primary Care Provider Office Phone # Fax #    Jeramy Conti [phone]       [facility] [address] [address]        Equal Access to Services     DESITN FAIRCHILD : Hadii aad ku hadasho Soomaali, waaxda luqadaha, qaybta kaalmada adeegyada, waxay idiin hayaan janna burtonarash lalawrence . So [facility] [phone].    ATENCIÓN: Si habla español, tiene a márquez disposición servicios gratuitos de asistencia lingüística. Llame al [phone].    We comply with applicable federal civil rights laws and [place] laws. We do not discriminate on the basis of race, color, national origin, age, disability, sex, sexual orientation, or gender identity.            Thank you!     Thank you for choosing [facility]  for your care. Our goal is always to provide you with excellent care. Hearing back from our patients is one way we can continue to improve our services. Please take a few minutes to complete the written survey that you may receive in the mail after your visit with us. Thank you!             Your Updated Medication List - Protect others around you: Learn how to safely use, store and throw away your medicines at www.disposemymeds.org.          This list is accurate as of: 11/29/17  1:38 PM.  Always use your most recent med list.                   Brand Name Dispense Instructions for use Diagnosis    NO ACTIVE MEDICATIONS      .

## 2017-11-29 NOTE — NURSING NOTE
"Initial /70 (BP Location: Right arm, Patient Position: Chair, Cuff Size: Adult Small)  Pulse 84  Ht 5' 6\" (1.676 m)  Wt 134 lb (60.8 kg)  BMI 21.63 kg/m2 Estimated body mass index is 21.63 kg/(m^2) as calculated from the following:    Height as of this encounter: 5' 6\" (1.676 m).    Weight as of this encounter: 134 lb (60.8 kg). .      "

## 2017-11-29 NOTE — PROGRESS NOTES
"   SUBJECTIVE:   CC: Malorie Gill is an 62 year old woman who presents for preventive health visit.   Gets healthcare infrequenty; states vaginal prolapse has worsened but still just a nuisance  Has a large breast mass lateral left breast that she states has not changed over \"many years\"; she declines a mammogram or sonogram    Healthy Habits:    Do you get at least three servings of calcium containing foods daily (dairy, green leafy vegetables, etc.)? yes    Amount of exercise or daily activities, outside of work: none day(s) per week    Problems taking medications regularly No    Medication side effects: No    Have you had an eye exam in the past two years? yes    Do you see a dentist twice per year? yes    Do you have sleep apnea, excessive snoring or daytime drowsiness?yes snore            Today's PHQ-2 Score:   PHQ-2 ( 1999 [employer]) 11/29/2017 4/13/2017   Q1: Little interest or pleasure in doing things 0 0   Q2: Feeling down, depressed or hopeless 0 0   PHQ-2 Score 0 0   Q1: Little interest or pleasure in doing things - -   Q2: Feeling down, depressed or hopeless - -   PHQ-2 Score - -         Abuse: Current or Past(Physical, Sexual or Emotional)- No  Do you feel safe in your environment - Yes  Social History   Substance Use Topics     Smoking status: Never Smoker     Smokeless tobacco: Never Used     Alcohol use Yes      Comment: rare; 2-3 drinks per month or less     The patient does not drink >3 drinks per day nor >7 drinks per week.    Reviewed orders with patient.  Reviewed health maintenance and updated orders accordingly - Yes  Labs reviewed in EPIC  BP Readings from Last 3 Encounters:   11/29/17 126/70   09/21/17 118/76   04/28/17 124/82    Wt Readings from Last 3 Encounters:   11/29/17 134 lb (60.8 kg)   09/21/17 130 lb (59 kg)   04/28/17 127 lb (57.6 kg)                  Patient Active Problem List   Diagnosis     CARDIOVASCULAR SCREENING; LDL GOAL LESS THAN 160     Advanced directives, " counseling/discussion     Health Care Home     Past Surgical History:   Procedure Laterality Date     SALPINGO OOPHORECTOMY,R/L/SHANNON      Laparoscopic LEFT salping-oophorectomy     TUBAL LIGATION  2000    RIGHT tubal ligation       Social History   Substance Use Topics     Smoking status: Never Smoker     Smokeless tobacco: Never Used     Alcohol use Yes      Comment: rare; 2-3 drinks per month or less     Family History   Problem Relation Age of Onset     Hypertension Mother           CANCER Father      prostate     Hypertension Father      CEREBROVASCULAR DISEASE Father           Other Cancer Father           DIABETES Maternal Grandmother      grandmother               Patient over age 50, mutual decision to screen reflected in health maintenance.      Pertinent mammograms are reviewed under the imaging tab.  History of abnormal Pap smear: NO - age 30- 65 PAP every 3 years recommended    Reviewed and updated as needed this visit by clinical staff         Reviewed and updated as needed this visit by Provider              ROS:  C: NEGATIVE for fever, chills, change in weight  I: NEGATIVE for worrisome rashes, moles or lesions  E: NEGATIVE for vision changes or irritation  ENT: NEGATIVE for ear, mouth and throat problems  R: NEGATIVE for significant cough or SOB  B: NEGATIVE for masses, tenderness or discharge  CV: NEGATIVE for chest pain, palpitations or peripheral edema  GI: NEGATIVE for nausea, abdominal pain, heartburn, or change in bowel habits  : NEGATIVE for unusual urinary or vaginal symptoms. No vaginal bleeding.   menopausal female: positive for pelvic pressure  M: NEGATIVE for significant arthralgias or myalgia  N: NEGATIVE for weakness, dizziness or paresthesias  P: NEGATIVE for changes in mood or affect     OBJECTIVE:   There were no vitals taken for this visit.  EXAM:  GENERAL APPEARANCE: healthy, alert and no distress  EYES: Eyes grossly normal to inspection, PERRL and  conjunctivae and sclerae normal  HENT: ear canals and TM's normal, nose and mouth without ulcers or lesions, oropharynx clear and oral mucous membranes moist  NECK: no adenopathy, no asymmetry, masses, or scars and thyroid normal to palpation  RESP: lungs clear to auscultation - no rales, rhonchi or wheezes  BREAST: no skin changes; large mobile irregular 6-7cm mass left lateral breast; nontender; no nipple discharge  CV: regular rate and rhythm, normal S1 S2, no S3 or S4, no murmur, click or rub, no peripheral edema and peripheral pulses strong  ABDOMEN: soft, nontender, no hepatosplenomegaly, no masses and bowel sounds normal   (female): normal female external genitalia, normal urethral meatus, vaginal mucosal atrophy noted, normal cervix, adnexae, and uterus without masses. and 2+ cystocele, 2+ uterine decensus, 1+ rectocele  RECTAL: normal tone; no masses  MS: no musculoskeletal defects are noted and gait is age appropriate without ataxia  SKIN: no suspicious lesions or rashes  NEURO: Normal strength and tone, sensory exam grossly normal, mentation intact and speech normal  PSYCH: mentation appears normal and affect normal/bright    ASSESSMENT/PLAN:       ICD-10-CM    1. Encounter for gynecological examination with abnormal finding Z01.411    2. Screening for malignant neoplasm of cervix Z12.4 Pap imaged thin layer screen with HPV - recommended age 30 - 65 years (select HPV order below)     HPV High Risk Types DNA Cervical   3. Uterovaginal prolapse, incomplete N81.2    4. Breast mass N63.0        COUNSELING:   Reviewed preventive health counseling, as reflected in patient instructions  Special attention given to:        Future options for tx of uterovaginal prolapse; discussed mammogram but pt strongly declines and states mass in left breast is unchanged       Regular exercise       Healthy diet/nutrition       Vision screening       Osteoporosis Prevention/Bone Health         reports that she has never  "smoked. She has never used smokeless tobacco.    Estimated body mass index is 20.98 kg/(m^2) as calculated from the following:    Height as of 9/21/17: 5' 6\" (1.676 m).    Weight as of 9/21/17: 130 lb (59 kg).         Counseling Resources:  ATP IV Guidelines  Pooled Cohorts Equation Calculator  Breast Cancer Risk Calculator  FRAX Risk Assessment  ICSI Preventive Guidelines  Dietary Guidelines for Americans, 2010  USDA's MyPlate  ASA Prophylaxis  Lung CA Screening    Jocelynn Pham MD  [facility]  "

## 2017-12-01 LAB
COPATH REPORT: NORMAL
PAP: NORMAL

## 2017-12-05 LAB
FINAL DIAGNOSIS: NORMAL
HPV HR 12 DNA CVX QL NAA+PROBE: NEGATIVE
HPV16 DNA SPEC QL NAA+PROBE: NEGATIVE
HPV18 DNA SPEC QL NAA+PROBE: NEGATIVE
SPECIMEN DESCRIPTION: NORMAL

## 2018-10-12 ENCOUNTER — TELEPHONE (OUTPATIENT)
Dept: OBGYN | Facility: CLINIC | Age: 63
End: 2018-10-12

## 2018-10-12 NOTE — TELEPHONE ENCOUNTER
Panel Management Review    Date of last visit with a [facility] provider: mericle on 11/29/17.  Date of next visit with a [facility] provider: None.    Health Maintenance List    Health Maintenance   Topic Date Due     TETANUS IMMUNIZATION (SYSTEM ASSIGNED)  04/12/1971     HIV SCREEN (SYSTEM ASSIGNED)  04/12/1971     MAMMO SCREEN Q2 YR (SYSTEM ASSIGNED)  04/12/2003     FALL RISK ASSESSMENT  04/12/2018     DEXA SCAN SCREENING (SYSTEM ASSIGNED)  04/12/2018     PNEUMOCOCCAL (1 of 2 - PCV13) 04/12/2018     INFLUENZA VACCINE (1) 09/01/2018     PHQ-2 Q1 YR  11/29/2018     ADVANCE DIRECTIVE PLANNING Q5 YRS  12/09/2018     PAP Q3 YR  11/29/2020     HPV Q3 Years  11/29/2020     LIPID SCREEN Q5 YR FEMALE (SYSTEM ASSIGNED)  04/13/2022     COLONOSCOPY Q10 YR  04/28/2027     HEPATITIS C SCREENING  Completed       Composite cancer screening  Chart review shows that this patient is due/due soon for the following Mammogram  Lab Results   Component Value Date    PAP NIL 11/29/2017     Past Surgical History:   Procedure Laterality Date     SALPINGO OOPHORECTOMY,R/L/SHANNON  2000    Laparoscopic LEFT salping-oophorectomy     TUBAL LIGATION  2000    RIGHT tubal ligation       Is hysterectomy listed in surgical history? No   Is mastectomy listed in surgical history? No     Summary:    Patient is due/failing the following:   Mammogram    Action needed: Patient needs office visit for mammogram.    Type of outreach:  Sent letter.      Staff Signature:  Lady Omer

## 2018-11-29 ENCOUNTER — OFFICE VISIT (OUTPATIENT)
Dept: FAMILY MEDICINE | Facility: CLINIC | Age: 63
End: 2018-11-29
Payer: COMMERCIAL

## 2018-11-29 DIAGNOSIS — Z13.6 CARDIOVASCULAR SCREENING; LDL GOAL LESS THAN 160: Primary | ICD-10-CM

## 2018-11-29 DIAGNOSIS — Z00.00 PREVENTATIVE HEALTH CARE: ICD-10-CM

## 2018-11-29 DIAGNOSIS — Z11.4 SCREENING FOR HIV (HUMAN IMMUNODEFICIENCY VIRUS): ICD-10-CM

## 2018-11-29 LAB
ALBUMIN SERPL-MCNC: 4 G/DL (ref 3.4–5)
ALP SERPL-CCNC: 64 U/L (ref 40–150)
ALT SERPL W P-5'-P-CCNC: 37 U/L (ref 0–50)
ANION GAP SERPL CALCULATED.3IONS-SCNC: 6 MMOL/L (ref 3–14)
AST SERPL W P-5'-P-CCNC: 30 U/L (ref 0–45)
BILIRUB SERPL-MCNC: 0.5 MG/DL (ref 0.2–1.3)
BUN SERPL-MCNC: 15 MG/DL (ref 7–30)
CALCIUM SERPL-MCNC: 8.6 MG/DL (ref 8.5–10.1)
CHLORIDE SERPL-SCNC: 102 MMOL/L (ref 94–109)
CHOLEST SERPL-MCNC: 188 MG/DL
CO2 SERPL-SCNC: 30 MMOL/L (ref 20–32)
CREAT SERPL-MCNC: 0.83 MG/DL (ref 0.52–1.04)
GFR SERPL CREATININE-BSD FRML MDRD: 69 ML/MIN/1.7M2
GLUCOSE SERPL-MCNC: 106 MG/DL (ref 70–99)
HDLC SERPL-MCNC: 86 MG/DL
LDLC SERPL CALC-MCNC: 87 MG/DL
NONHDLC SERPL-MCNC: 102 MG/DL
POTASSIUM SERPL-SCNC: 3.8 MMOL/L (ref 3.4–5.3)
PROT SERPL-MCNC: 7.2 G/DL (ref 6.8–8.8)
SODIUM SERPL-SCNC: 138 MMOL/L (ref 133–144)
TRIGL SERPL-MCNC: 73 MG/DL

## 2018-11-29 PROCEDURE — 87389 HIV-1 AG W/HIV-1&-2 AB AG IA: CPT | Performed by: FAMILY MEDICINE

## 2018-11-29 PROCEDURE — 99397 PER PM REEVAL EST PAT 65+ YR: CPT | Performed by: FAMILY MEDICINE

## 2018-11-29 PROCEDURE — 80053 COMPREHEN METABOLIC PANEL: CPT | Performed by: FAMILY MEDICINE

## 2018-11-29 PROCEDURE — 36415 COLL VENOUS BLD VENIPUNCTURE: CPT | Performed by: FAMILY MEDICINE

## 2018-11-29 PROCEDURE — 80061 LIPID PANEL: CPT | Performed by: FAMILY MEDICINE

## 2018-11-29 ASSESSMENT — PAIN SCALES - GENERAL: PAINLEVEL: NO PAIN (0)

## 2018-11-29 NOTE — PROGRESS NOTES
"  SUBJECTIVE:   Malorie Gill is a 63 year old female who presents for Preventive Visit.    Are you in the first 12 months of your Medicare Part B coverage?  No    Physical Health:    In general, how would you rate your overall physical health? good    Outside of work, how many days during the week do you exercise? 2-3 days/week    Outside of work, approximately how many minutes a day do you exercise?greater than 60 minutes    If you drink alcohol do you typically have >3 drinks per day or >7 drinks per week? No    Do you usually eat at least 4 servings of fruit and vegetables a day, include whole grains & fiber and avoid regularly eating high fat or \"junk\" foods? Yes    Do you have any problems taking medications regularly?  No    Do you have any side effects from medications? none    Needs assistance for the following daily activities: no assistance needed    Which of the following safety concerns are present in your home?  none identified     Hearing impairment: Yes, Need to ask people to speak up or repeat themselves. Once in awhile    In the past 6 months, have you been bothered by leaking of urine? no    Wt Readings from Last 10 Encounters:   11/29/18 58.7 kg (129 lb 8 oz)   11/29/17 60.8 kg (134 lb)   09/21/17 59 kg (130 lb)   04/28/17 57.6 kg (127 lb)   04/13/17 57.7 kg (127 lb 3.2 oz)   12/18/13 60.1 kg (132 lb 6.4 oz)   12/09/13 58.7 kg (129 lb 4.8 oz)   12/11/12 61.2 kg (135 lb)   12/01/11 58.1 kg (128 lb 1.6 oz)   03/09/09 57.6 kg (127 lb)       HEARING FREQUENCY    Right Ear:      1000 Hz RESPONSE- on Level:   20 db  (Conditioning sound)   1000 Hz: RESPONSE- on Level:   20 db    2000 Hz: RESPONSE- on Level:   20 db    4000 Hz: RESPONSE- on Level: 30 db    Left Ear:      4000 Hz: RESPONSE- on Level: 75 db   2000 Hz: RESPONSE- on Level: 25 db   1000 Hz: RESPONSE- on Level:   20 db     500 Hz: RESPONSE- on Level:   20 db     Right Ear:    500 Hz: RESPONSE- on Level: 25 db    Hearing Acuity: " REFER    Hearing Assessment: abnormal--refer to audiology  Mental Health:    In general, how would you rate your overall mental or emotional health? good  PHQ-2 Score:      Do you feel safe in your environment? Yes    Do you have a Health Care Directive? Yes: Patient states has Advance Directive and will bring in a copy to clinic.    Patient informed that anything we discuss that is not related to preventative medicine, may be billed for; patient verbalizes understanding.      Additional concerns to address?  No    Fall risk:  Fallen 2 or more times in the past year?: No  Any fall with injury in the past year?: No    Cognitive Screenin) Repeat 3 items (Leader, Season, Table)    2) Clock draw: NORMAL  3) 3 item recall: Recalls 3 objects  Results: 3 items recalled, normal clock: COGNITIVE IMPAIRMENT LESS LIKELY    Mini-CogTM Copyright [employer]. Licensed by the author for use in [facility]; reprinted with permission ([email or web address]). All rights reserved.      Do you have sleep apnea, excessive snoring or daytime drowsiness?: yes    Reviewed and updated as needed this visit by clinical staff  Tobacco  Allergies  Meds  Med Hx  Surg Hx  Fam Hx  Soc Hx        Reviewed and updated as needed this visit by Provider        Social History     Tobacco Use     Smoking status: Never Smoker     Smokeless tobacco: Never Used   Substance Use Topics     Alcohol use: Yes     Comment: rare; 2-3 drinks per month or less                           Current providers sharing in care for this patient include:   Patient Care Team:  Jeramy Conti MD as PCP - General    The following health maintenance items are reviewed in Epic and correct as of today:  Health Maintenance   Topic Date Due     DTAP/TDAP/TD IMMUNIZATION (1 - Tdap) 1960     MAMMO SCREEN Q2 YR (SYSTEM ASSIGNED)  1993     ZOSTER IMMUNIZATION (1 of 2) 2003     DEXA SCAN SCREENING (SYSTEM ASSIGNED)  2018     PNEUMOVAX IMMUNIZATION 65+  "LOW/MEDIUM RISK (1 of 2 - PCV13) 04/12/2018     INFLUENZA VACCINE (1) 09/01/2018     ADVANCE DIRECTIVE PLANNING Q5 YRS  12/09/2018     FALL RISK ASSESSMENT  11/29/2019     PHQ-2 Q1 YR  11/29/2019     PAP Q3 YR  11/29/2020     HPV Q3 Years  11/29/2020     LIPID SCREEN Q5 YR FEMALE (SYSTEM ASSIGNED)  11/29/2023     COLONOSCOPY Q10 YR  04/28/2027     HIV SCREEN (SYSTEM ASSIGNED)  Completed     HEPATITIS C SCREENING  Completed     IPV IMMUNIZATION  Aged Out     MENINGITIS IMMUNIZATION  Aged Out         ROS:  Constitutional, HEENT, cardiovascular, pulmonary, gi and gu systems are negative, except as otherwise noted.    OBJECTIVE:   /88   Pulse 78   Temp 98.2  F (36.8  C) (Tympanic)   Ht 1.676 m (5' 6\")   Wt 58.7 kg (129 lb 8 oz)   BMI 20.90 kg/m   Estimated body mass index is 20.9 kg/m  as calculated from the following:    Height as of this encounter: 1.676 m (5' 6\").    Weight as of this encounter: 58.7 kg (129 lb 8 oz).  EXAM:   GENERAL: healthy, alert and no distress  NECK: no adenopathy, no asymmetry, masses, or scars and thyroid normal to palpation  RESP: lungs clear to auscultation - no rales, rhonchi or wheezes  CV: regular rate and rhythm, normal S1 S2, no S3 or S4, no murmur, click or rub, no peripheral edema and peripheral pulses strong  ABDOMEN: soft, nontender, no hepatosplenomegaly, no masses and bowel sounds normal  MS: no gross musculoskeletal defects noted, no edema    Diagnostic Test Results:  none     ASSESSMENT / PLAN:   (Z13.6) CARDIOVASCULAR SCREENING; LDL GOAL LESS THAN 160  (primary encounter diagnosis)  Plan: Lipid panel reflex to direct LDL Fasting    (Z11.4) Screening for HIV (human immunodeficiency virus)  Plan: HIV Screening    (Z00.00) Preventative health care  Plan: Comprehensive metabolic panel (BMP + Alb, Alk         Phos, ALT, AST, Total. Bili, TP)      End of Life Planning:  Patient currently has an advanced directive: Yes.  Practitioner is supportive of " "decision.    COUNSELING:  Reviewed preventive health counseling, as reflected in patient instructions       Regular exercise       Healthy diet/nutrition       Vision screening       Hearing screening       Colon cancer screening       Hepatitis C screening    BP Readings from Last 1 Encounters:   12/09/18 136/88     Estimated body mass index is 20.9 kg/m  as calculated from the following:    Height as of this encounter: 1.676 m (5' 6\").    Weight as of this encounter: 58.7 kg (129 lb 8 oz).      Weight management plan: Patient referred to endocrine and/or weight management specialty     reports that  has never smoked. she has never used smokeless tobacco.      Appropriate preventive services were discussed with this patient, including applicable screening as appropriate for cardiovascular disease, diabetes, osteopenia/osteoporosis, and glaucoma.  As appropriate for age/gender, discussed screening for colorectal cancer, prostate cancer, breast cancer, and cervical cancer. Checklist reviewing preventive services available has been given to the patient.    Reviewed patients plan of care and provided an AVS. The Intermediate Care Plan ( asthma action plan, low back pain action plan, and migraine action plan) for Malorie meets the Care Plan requirement. This Care Plan has been established and reviewed with the Patient.    Counseling Resources:  ATP IV Guidelines  Pooled Cohorts Equation Calculator  Breast Cancer Risk Calculator  FRAX Risk Assessment  ICSI Preventive Guidelines  Dietary Guidelines for Americans, 2010  USDA's MyPlate  ASA Prophylaxis  Lung CA Screening    Jeramy Conti MD  [facility]  "

## 2018-11-29 NOTE — PATIENT INSTRUCTIONS
Preventive Health Recommendations    See your health care provider every year to    Review health changes.     Discuss preventive care.      Review your medicines if your doctor has prescribed any.      You no longer need a yearly Pap test unless you've had an abnormal Pap test in the past 10 years. If you have vaginal symptoms, such as bleeding or discharge, be sure to talk with your provider about a Pap test.      Every 1 to 2 years, have a mammogram.  If you are over 69, talk with your health care provider about whether or not you want to continue having screening mammograms.      Every 10 years, have a colonoscopy. Or, have a yearly FIT test (stool test). These exams will check for colon cancer.       Have a cholesterol test every 5 years, or more often if your doctor advises it.       Have a diabetes test (fasting glucose) every three years. If you are at risk for diabetes, you should have this test more often.       At age 65, have a bone density scan (DEXA) to check for osteoporosis (brittle bone disease).    Shots:    Get a flu shot each year.    Get a tetanus shot every 10 years.    Talk to your doctor about your pneumonia vaccines. There are now two you should receive - Pneumovax (PPSV 23) and Prevnar (PCV 13).    Talk to your pharmacist about the shingles vaccine.    Talk to your doctor about the hepatitis B vaccine.    Nutrition:     Eat at least 5 servings of fruits and vegetables each day.      Eat whole-grain bread, whole-wheat pasta and brown rice instead of white grains and rice.      Get adequate Calcium and Vitamin D.     Lifestyle    Exercise at least 150 minutes a week (30 minutes a day, 5 days a week). This will help you control your weight and prevent disease.      Limit alcohol to one drink per day.      No smoking.       Wear sunscreen to prevent skin cancer.       See your dentist twice a year for an exam and cleaning.      See your eye doctor every 1 to 2 years to screen for conditions  such as glaucoma, macular degeneration and cataracts.    Personalized Prevention Plan  You are due for the preventive services outlined below.  Your care team is available to assist you in scheduling these services.  If you have already completed any of these items, please share that information with your care team to update in your medical record.  Health Maintenance Due   Topic Date Due     Tetanus Vaccine - every 10 years  04/12/1971     HIV SCREEN (SYSTEM ASSIGNED)  04/12/1971     Mammogram - every 2 years  04/12/2003     FALL RISK ASSESSMENT  04/12/2018     Bone Density Screening (Dexa)  04/12/2018     Pneumococcal Vaccine (1 of 2 - PCV13) 04/12/2018     Flu Vaccine (1) 09/01/2018     Depression Assessment 2 - yearly  11/29/2018     Discuss Advance Directive Planning  12/09/2018

## 2018-11-30 LAB — HIV 1+2 AB+HIV1 P24 AG SERPL QL IA: NONREACTIVE

## 2018-12-09 VITALS
BODY MASS INDEX: 20.81 KG/M2 | HEIGHT: 66 IN | HEART RATE: 78 BPM | SYSTOLIC BLOOD PRESSURE: 136 MMHG | WEIGHT: 129.5 LBS | TEMPERATURE: 98.2 F | DIASTOLIC BLOOD PRESSURE: 88 MMHG

## 2019-01-15 ENCOUNTER — TRANSFERRED RECORDS (OUTPATIENT)
Dept: HEALTH INFORMATION MANAGEMENT | Facility: CLINIC | Age: 64
End: 2019-01-15

## 2019-03-20 ENCOUNTER — TELEPHONE (OUTPATIENT)
Dept: FAMILY MEDICINE | Facility: CLINIC | Age: 64
End: 2019-03-20

## 2019-03-20 NOTE — TELEPHONE ENCOUNTER
Reason for call:  Patient reporting a symptom    Symptom or request: Pat is calling to see what she should do.  She is having some heart palpitations and wondering does she come in for an EKG, get referral for cardiologist to be evaluated.  Prefers to speak directly with Dr. Gatica.   Please review and advise.  Thank you..Eusebia De Los Santos    Duration (how long have symptoms been present): unknown    Have you been treated for this before? unknown    Phone Number patient can be reached at:  Home number on file [phone] (home)    Best Time:  Any time    Can we leave a detailed message on this number:  YES    Call taken on 3/20/2019 at 9:18 AM by Eusebia De Los Santos

## 2019-03-20 NOTE — TELEPHONE ENCOUNTER
Message left on patient's answering machine to call the [facility] RN back.  Miky Butler RN

## 2019-03-25 ENCOUNTER — OFFICE VISIT (OUTPATIENT)
Dept: FAMILY MEDICINE | Facility: CLINIC | Age: 64
End: 2019-03-25
Payer: MEDICARE

## 2019-03-25 DIAGNOSIS — R00.2 PALPITATIONS: Primary | ICD-10-CM

## 2019-03-25 PROCEDURE — 99213 OFFICE O/P EST LOW 20 MIN: CPT | Performed by: FAMILY MEDICINE

## 2019-03-25 PROCEDURE — 93000 ELECTROCARDIOGRAM COMPLETE: CPT | Performed by: FAMILY MEDICINE

## 2019-03-25 ASSESSMENT — PAIN SCALES - GENERAL: PAINLEVEL: NO PAIN (0)

## 2019-03-25 ASSESSMENT — MIFFLIN-ST. JEOR: SCORE: 1149.16

## 2019-03-25 NOTE — PROGRESS NOTES
"SUBJECTIVE:                                                    Malorie Gill is a 63 year old female who presents to clinic today for the following health issues:    Chief Complaint   Patient presents with     Follow Up     **Here to follow up for heart palpitations. Has been ongoing for the past month where she has really noticed them. Discussed with you on the phone on 2019.    **When it gets bad she gets some tightness on the right side and she does get fatigued.     Problem list and histories reviewed & adjusted, as indicated.  Additional history:     Patient Active Problem List   Diagnosis     CARDIOVASCULAR SCREENING; LDL GOAL LESS THAN 160     Advanced directives, counseling/discussion     Health Care Home     Past Surgical History:   Procedure Laterality Date     SALPINGO OOPHORECTOMY,R/L/SHANNON      Laparoscopic LEFT salping-oophorectomy     TUBAL LIGATION      RIGHT tubal ligation       Social History     Tobacco Use     Smoking status: Never Smoker     Smokeless tobacco: Never Used   Substance Use Topics     Alcohol use: Yes     Comment: rare; 2-3 drinks per month or less     Family History   Problem Relation Age of Onset     Hypertension Mother              Cancer Father         prostate     Hypertension Father      Cerebrovascular Disease Father              Other Cancer Father              Diabetes Maternal Grandmother         grandmother         Current Outpatient Medications   Medication Sig Dispense Refill     NO ACTIVE MEDICATIONS .       Allergies   Allergen Reactions     Nka [No Known Allergies]      ROS:  Constitutional, HEENT, cardiovascular, pulmonary, gi and gu systems are negative, except as otherwise noted.    OBJECTIVE:                                                    /84   Pulse 68   Temp 98.9  F (37.2  C) (Tympanic)   Resp 20   Ht 1.676 m (5' 6\")   Wt 58.7 kg (129 lb 8 oz)   BMI 20.90 kg/m   Body mass index is 20.9 kg/m .   GENERAL: " healthy, alert, well nourished, well hydrated, no distress  HENT: ear canals- normal; TMs- normal; Nose- normal; Mouth- no ulcers, no lesions  NECK: no tenderness, no adenopathy, no asymmetry, no masses, no stiffness; thyroid- normal to palpation  RESP: lungs clear to auscultation - no rales, no rhonchi, no wheezes  CV: regular rates and rhythm, normal S1 S2, no S3 or S4 and no murmur, no click or rub -  ABDOMEN: soft, no tenderness, no  hepatosplenomegaly, no masses, normal bowel sounds       ASSESSMENT/PLAN:                                                      (R00.2) Palpitations  (primary encounter diagnosis)  Most likely pvc's  If  Becomes symptomatic have zio patch.  Plan: EKG 12-lead complete w/read - Clinics, CBC with        platelets, EKG 12-lead complete w/read -         Clinics, TSH with free T4 reflex,    reports that  has never smoked. she has never used smokeless tobacco.      Weight management plan: Discussed healthy diet and exercise guidelines      [facility]

## 2019-03-25 NOTE — PATIENT INSTRUCTIONS
Relaxation technique 1: Breathing meditation for stress relief  With its focus on full, cleansing breaths, deep breathing is a simple, yet powerful, relaxation technique. It s easy to learn, can be practiced almost anywhere, and provides a quick way to get your stress levels in check. Deep breathing is the cornerstone of many other relaxation practices, too, and can be combined with other relaxing elements such as aromatherapy and music. All you really need is a few minutes and a place to stretch out.  Practicing deep breathing meditation  The key to deep breathing is to breathe deeply from the abdomen, getting as much fresh air as possible in your lungs. When you take deep breaths from the abdomen, rather than shallow breaths from your upper chest, you inhale more oxygen. The more oxygen you get, the less tense, short of breath, and anxious you feel.  Sit comfortably with your back straight. Put one hand on your chest and the other on your stomach.   Breathe in through your nose. The hand on your stomach should rise. The hand on your chest should move very little.   Exhale through your mouth, pushing out as much air as you can while neymar your abdominal muscles. The hand on your stomach should move in as you exhale, but your other hand should move very little.   Continue to breathe in through your nose and out through your mouth. Try to inhale enough so that your lower abdomen rises and falls. Count slowly as you exhale.   If you find it difficult breathing from your abdomen while sitting up, try lying on the floor. Put a small book on your stomach, and try to breathe so that the book rises as you inhale and falls as you exhale.   Relaxation technique 2: Progressive muscle relaxation for stress relief  Progressive muscle relaxation involves a two-step process in which you systematically tense and relax different muscle groups in the body.  With regular practice, progressive muscle relaxation gives you an  intimate familiarity with what tension--as well as complete relaxation--feels like in different parts of the body. This awareness helps you spot and counteract the first signs of the muscular tension that accompanies stress. And as your body relaxes, so will your mind. You can combine deep breathing with progressive muscle relaxation for an additional level of stress relief.  Practicing progressive muscle relaxation  Before practicing Progressive Muscle Relaxation, consult with your doctor if you have a history of muscle spasms, back problems, or other serious injuries that may be aggravated by tensing muscles.  Most progressive muscle relaxation practitioners start at the feet and work their way up to the face. For a sequence of muscle groups to follow, see the box below.  Loosen your clothing, take off your shoes, and get comfortable.   Take a few minutes to relax, breathing in and out in slow, deep breaths.   When you re relaxed and ready to start, shift your attention to your right foot. Take a moment to focus on the way it feels.   Slowly tense the muscles in your right foot, squeezing as tightly as you can. Hold for a count of 10.   Relax your right foot. Focus on the tension flowing away and the way your foot feels as it becomes limp and loose.   Stay in this relaxed state for a moment, breathing deeply and slowly.   When you re ready, shift your attention to your left foot. Follow the same sequence of muscle tension and release.   Move slowly up through your body, neymar and relaxing the muscle groups as you go.   It may take some practice at first, but try not to tense muscles other than those intended.   Progressive Muscle Relaxation Sequence  The most popular sequence runs as follows:  1. Right foot*   2. Left foot   3. Right calf   4. Left calf   5. Right thigh  6. Left thigh   7. Hips and buttocks   8. Stomach   9. Chest   10. Back  11. Right arm and hand   12. Left arm and hand   13. Neck and  shoulders   14. Face    * If you are left-handed you may want to begin with your left foot instead.  Relaxation technique 3: Body scan meditation for stress relief  A body scan is similar to progressive muscle relaxation except, instead of tensing and relaxing muscles, you simply focus on the sensations in each part of your body.   Practicing body scan meditation  Lie on your back, legs uncrossed, arms relaxed at your sides, eyes open or closed. Focus on your breathing , allowing your stomach to rise as you inhale and fall as you exhale. Breathe deeply for about two minutes, until you start to feel comfortable and relaxed.   Turn your focus to the toes of your right foot. Notice any sensations you feel while continuing to also focus on your breathing. Imagine each deep breath flowing to your toes. Remain focused on this area for one to two minutes.   Move your focus to the sole of your right foot. Tune in to any sensations you feel in that part of your body and imagine each breath flowing from the sole of your foot. After one or two minutes, move your focus to your right ankle and repeat. Move to your calf, knee, thigh, hip, and then repeat the sequence for your left leg. From there, move up the torso, through the lower back and abdomen, the upper back and chest, and the shoulders. Pay close attention to any area of the body that causes you pain or discomfort.   Move your focus to the fingers on your right hand and then move up to the wrist, forearm, elbow, upper arm, and shoulder. Repeat for your left arm. Then move through the neck and throat, and finally all the regions of your face, the back of the head, and the top of the head. Pay close attention to your jaw, chin, lips, tongue, nose, cheeks, eyes, forehead, temples and scalp. When you reach the very top of your head, let your breath reach out beyond your body and imagine yourself hovering above yourself.   After completing the body scan, relax for a while in  silence and stillness, noting how your body feels. Then open your eyes slowly. Take a moment to stretch, if necessary.   For a guided body scan meditation, see the Resources section below.  Relaxation technique 4: Mindfulness for stress relief  Mindfulness is the ability to remain aware of how you re feeling right now, your  [id number]  experience--both internal and external. Thinking about the past--blaming and judging yourself--or worrying about the future can often lead to a degree of stress that is overwhelming. But by staying calm and focused in the present moment, you can bring your nervous system back into balance. Mindfulness can be applied to activities such as walking, exercising, eating, or meditation.  Meditations that cultivate mindfulness have long been used to reduce overwhelming stress. Some of these meditations bring you into the present by focusing your attention on a single repetitive action, such as your breathing, a few repeated words, or flickering light from a candle. Other forms of mindfulness meditation encourage you to follow and then release internal thoughts or sensations.  Practicing mindfulness meditation  Key points in mindfulness mediation are:  A quiet environment. Choose a secluded place in your home, office, garden, place of [place], or in the great outdoors where you can relax without distractions or interruptions.   A comfortable position. Get comfortable, but avoid lying down as this may lead to you falling asleep. Sit up with your spine straight, either in a chair or on the floor. You can also try a cross-legged or gloria position.   A point of focus. This point can be internal - a feeling or imaginary scene - or something external - a flame or meaningful word or phrase that you repeat it throughout your session. You may meditate with eyes open or closed. Also choose to focus on an object in your surroundings to enhance your concentration, or alternately, you can close  your eyes.   An observant, noncritical attitude. Don t worry about distracting thoughts that go through your mind or about how well you re doing. If thoughts intrude during your relaxation session, don t fight them. Instead, gently turn your attention back to your point of focus.   Relaxation technique 5: Visualization meditation for stress relief  Visualization, or guided imagery, is a variation on traditional meditation that requires you to employ not only your visual sense, but also your sense of taste, touch, smell, and sound. When used as a relaxation technique, visualization involves imagining a scene in which you feel at peace, free to let go of all tension and anxiety.  Choose whatever setting is most calming to you, whether it s a tropical beach, a favorite childhood spot, or a quiet [place]ed [place]. You can do this visualization exercise on your own in silence, while listening to soothing music, or with a therapist (or an audio recording of a therapist) guiding you through the imagery. To help you employ your sense of hearing you can use a sound machine or download sounds that match your chosen setting--the sound of ocean waves if you ve chosen a beach, for example.  Practicing visualization  Find a quiet, relaxed place. Beginners sometimes fall asleep during a visualization meditation, so you might try sitting up or standing.  Close your eyes and let your worries drift away. Imagine your restful place. Picture it as vividly as you can--everything you can see, hear, smell, and feel. Visualization works best if you incorporate as many sensory details as possible, using at least three of your senses. When visualizing, choose imagery that appeals to you; don t select images because someone else suggests them, or because you think they should be appealing. Let your own images come up and work for you.  If you are thinking about a dock on a quiet lake, for example:   Walk slowly around the dock and notice the  colors and textures around you.   Spend some time exploring each of your senses.   See the sun setting over the water.   Hear the birds singing.   Smell the pine trees.   Feel the cool water on your bare feet.   Taste the fresh, clean air.   Enjoy the feeling of deep relaxation that envelopes you as you slowly explore your restful place. When you are ready, gently open your eyes and come back to the present.  Don't worry if you sometimes zone out or lose track of where you are during a guided imagery session. This is normal. You may also experience feelings of stiffness or heaviness in your limbs, minor, involuntary muscle-movements, or even cough or yawn. Again, these are normal responses.   Relaxation technique 6: Yoga and duane chi for stress relief  Yoga involves a series of both moving and stationary poses, combined with deep breathing. As well as reducing anxiety and stress, yoga can also improve flexibility, strength, balance, and stamina. Practiced regularly, it can also strengthen the relaxation response in your daily life. Since injuries can happen when yoga is practiced incorrectly, it s best to learn by attending group classes, hiring a private teacher, or at least following video instructions.  What type of yoga is best for stress?  Although almost all yoga classes end in a relaxation pose, classes that emphasize slow, steady movement, deep breathing, and gentle stretching are best for stress relief.  Satyananda is a traditional form of yoga. It features gentle poses, deep relaxation, and meditation, making it suitable for beginners as well as anyone primarily looking for stress reduction.   Hatha yoga is also reasonably gentle way to relieve stress and is suitable for beginners. Alternately, look for labels like gentle, for stress relief, or for beginners when selecting a yoga class.   Power yoga, with its intense poses and focus on fitness, is better suited to those looking for stimulation as well as  relaxation.   If you re unsure whether a specific yoga class is appropriate for stress relief, call the studio or ask the teacher.  Benjamin chi  If you ve ever seen a group of people in the park slowly moving in synch, you ve probably witnessed benjamin chi. Benjamin chi is a self-paced, non-competitive series of slow, flowing body movements. These movements emphasize concentration, relaxation, and the conscious circulation of vital energy throughout the body. Though benjamin chi has its roots in martial arts, today it is primarily practiced as a way of calming the mind, conditioning the body, and reducing stress. As in meditation, benjamin chi practitioners focus on their breathing and keeping their attention in the present moment.  Benjamin chi is a safe, low-impact option for people of all ages and levels of fitness, including older adults and those recovering from injuries. Like yoga, once you ve learned the basics of benjamin chi or qi gong, you can practice alone or with others, tailoring your sessions as you see fit.   Making relaxation techniques a part of your life  The best way to start and maintain a relaxation practice is to incorporate it into your daily routine. Between work, family, school, and other commitments, though, it can be tough for many people to find the time. Fortunately, many of the techniques can be practiced while you re doing other things.  Rhythmic exercise as a mindfulness relaxation technique  Rhythmic exercise--such as running, walking, rowing, or cycling--is most effective at relieving stress when performed with relaxation in mind. As with meditation, mindfulness requires being fully engaged in the present moment, focusing your mind on how your body feels right now. As you exercise, focus on the physicality of your body s movement and how your breathing complements that movement. If your mind wanders to other thoughts, gently return to focusing on your breathing and movement.  If walking or running, for example,  focus on each step--the sensation of your feet touching the ground, the rhythm of your breath while moving, and the feeling of the wind against your face.  Tips for fitting relaxation techniques into your life  If possible, schedule a set time to practice each day. Set aside one or two periods each day. You may find that it s easier to stick with your practice if you do it first thing in the morning, before other tasks and responsibilities get in the way.   Practice relaxation techniques while you re doing other things. Meditate while commuting to work on a bus or train, or waiting for a dentist appointment. Try deep breathing while you re doing housework or mowing the lawn. Mindfulness walking can be done while exercising your dog, walking to your car, or climbing the stairs at work instead of using the elevator. Once you ve learned techniques such as duane chi, you can practice them in your office or in the park at lunchtime.   If you exercise, improve the relaxation benefits by adopting mindfulness. Instead of zoning out or staring at a TV as you exercise, try focusing your attention on your body. If you re resistance training, for example, focus on coordinating your breathing with your movements and pay attention to how your body feels as you raise and lower the weights.   Avoid practicing when you re sleepy. These techniques can relax you so much that they can make you very sleepy, especially if it s close to bedtime. You will get the most benefit if you practice when you re fully awake and alert. Do not practice after eating a heavy meal or while using drugs, tobacco, or alcohol.   Expect ups and downs. Don t be discouraged if you skip a few days or even a few weeks. It happens. Just get started again and slowly build up to your old momentum.     Relaxation technique 1: Breathing meditation for stress relief  With its focus on full, cleansing breaths, deep breathing is a simple, yet powerful, relaxation  technique. It s easy to learn, can be practiced almost anywhere, and provides a quick way to get your stress levels in check. Deep breathing is the cornerstone of many other relaxation practices, too, and can be combined with other relaxing elements such as aromatherapy and music. All you really need is a few minutes and a place to stretch out.  Practicing deep breathing meditation  The key to deep breathing is to breathe deeply from the abdomen, getting as much fresh air as possible in your lungs. When you take deep breaths from the abdomen, rather than shallow breaths from your upper chest, you inhale more oxygen. The more oxygen you get, the less tense, short of breath, and anxious you feel.  Sit comfortably with your back straight. Put one hand on your chest and the other on your stomach.   Breathe in through your nose. The hand on your stomach should rise. The hand on your chest should move very little.   Exhale through your mouth, pushing out as much air as you can while neymar your abdominal muscles. The hand on your stomach should move in as you exhale, but your other hand should move very little.   Continue to breathe in through your nose and out through your mouth. Try to inhale enough so that your lower abdomen rises and falls. Count slowly as you exhale.   If you find it difficult breathing from your abdomen while sitting up, try lying on the floor. Put a small book on your stomach, and try to breathe so that the book rises as you inhale and falls as you exhale.   Relaxation technique 2: Progressive muscle relaxation for stress relief  Progressive muscle relaxation involves a two-step process in which you systematically tense and relax different muscle groups in the body.  With regular practice, progressive muscle relaxation gives you an intimate familiarity with what tension--as well as complete relaxation--feels like in different parts of the body. This awareness helps you spot and counteract the  first signs of the muscular tension that accompanies stress. And as your body relaxes, so will your mind. You can combine deep breathing with progressive muscle relaxation for an additional level of stress relief.  Practicing progressive muscle relaxation  Before practicing Progressive Muscle Relaxation, consult with your doctor if you have a history of muscle spasms, back problems, or other serious injuries that may be aggravated by tensing muscles.  Most progressive muscle relaxation practitioners start at the feet and work their way up to the face. For a sequence of muscle groups to follow, see the box below.  Loosen your clothing, take off your shoes, and get comfortable.   Take a few minutes to relax, breathing in and out in slow, deep breaths.   When you re relaxed and ready to start, shift your attention to your right foot. Take a moment to focus on the way it feels.   Slowly tense the muscles in your right foot, squeezing as tightly as you can. Hold for a count of 10.   Relax your right foot. Focus on the tension flowing away and the way your foot feels as it becomes limp and loose.   Stay in this relaxed state for a moment, breathing deeply and slowly.   When you re ready, shift your attention to your left foot. Follow the same sequence of muscle tension and release.   Move slowly up through your body, neymar and relaxing the muscle groups as you go.   It may take some practice at first, but try not to tense muscles other than those intended.   Progressive Muscle Relaxation Sequence  The most popular sequence runs as follows:  6. Right foot*   7. Left foot   8. Right calf   9. Left calf   10. Right thigh  11. Left thigh   12. Hips and buttocks   13. Stomach   14. Chest   15. Back  15. Right arm and hand   16. Left arm and hand   17. Neck and shoulders   18. Face    * If you are left-handed you may want to begin with your left foot instead.  Relaxation technique 3: Body scan meditation for stress  relief  A body scan is similar to progressive muscle relaxation except, instead of tensing and relaxing muscles, you simply focus on the sensations in each part of your body.   Practicing body scan meditation  Lie on your back, legs uncrossed, arms relaxed at your sides, eyes open or closed. Focus on your breathing , allowing your stomach to rise as you inhale and fall as you exhale. Breathe deeply for about two minutes, until you start to feel comfortable and relaxed.   Turn your focus to the toes of your right foot. Notice any sensations you feel while continuing to also focus on your breathing. Imagine each deep breath flowing to your toes. Remain focused on this area for one to two minutes.   Move your focus to the sole of your right foot. Tune in to any sensations you feel in that part of your body and imagine each breath flowing from the sole of your foot. After one or two minutes, move your focus to your right ankle and repeat. Move to your calf, knee, thigh, hip, and then repeat the sequence for your left leg. From there, move up the torso, through the lower back and abdomen, the upper back and chest, and the shoulders. Pay close attention to any area of the body that causes you pain or discomfort.   Move your focus to the fingers on your right hand and then move up to the wrist, forearm, elbow, upper arm, and shoulder. Repeat for your left arm. Then move through the neck and throat, and finally all the regions of your face, the back of the head, and the top of the head. Pay close attention to your jaw, chin, lips, tongue, nose, cheeks, eyes, forehead, temples and scalp. When you reach the very top of your head, let your breath reach out beyond your body and imagine yourself hovering above yourself.   After completing the body scan, relax for a while in silence and stillness, noting how your body feels. Then open your eyes slowly. Take a moment to stretch, if necessary.   For a guided body scan meditation,  see the Resources section below.  Relaxation technique 4: Mindfulness for stress relief  Mindfulness is the ability to remain aware of how you re feeling right now, your  [id number]  experience--both internal and external. Thinking about the past--blaming and judging yourself--or worrying about the future can often lead to a degree of stress that is overwhelming. But by staying calm and focused in the present moment, you can bring your nervous system back into balance. Mindfulness can be applied to activities such as walking, exercising, eating, or meditation.  Meditations that cultivate mindfulness have long been used to reduce overwhelming stress. Some of these meditations bring you into the present by focusing your attention on a single repetitive action, such as your breathing, a few repeated words, or flickering light from a candle. Other forms of mindfulness meditation encourage you to follow and then release internal thoughts or sensations.  Practicing mindfulness meditation  Key points in mindfulness mediation are:  A quiet environment. Choose a secluded place in your home, office, garden, place of [place], or in the great outdoors where you can relax without distractions or interruptions.   A comfortable position. Get comfortable, but avoid lying down as this may lead to you falling asleep. Sit up with your spine straight, either in a chair or on the floor. You can also try a cross-legged or gloria position.   A point of focus. This point can be internal - a feeling or imaginary scene - or something external - a flame or meaningful word or phrase that you repeat it throughout your session. You may meditate with eyes open or closed. Also choose to focus on an object in your surroundings to enhance your concentration, or alternately, you can close your eyes.   An observant, noncritical attitude. Don t worry about distracting thoughts that go through your mind or about how well you re doing. If thoughts  intrude during your relaxation session, don t fight them. Instead, gently turn your attention back to your point of focus.   Relaxation technique 5: Visualization meditation for stress relief  Visualization, or guided imagery, is a variation on traditional meditation that requires you to employ not only your visual sense, but also your sense of taste, touch, smell, and sound. When used as a relaxation technique, visualization involves imagining a scene in which you feel at peace, free to let go of all tension and anxiety.  Choose whatever setting is most calming to you, whether it s a tropical beach, a favorite childhood spot, or a quiet wooded [place]. You can do this visualization exercise on your own in silence, while listening to soothing music, or with a therapist (or an audio recording of a therapist) guiding you through the imagery. To help you employ your sense of hearing you can use a sound machine or download sounds that match your chosen setting--the sound of ocean waves if you ve chosen a beach, for example.  Practicing visualization  Find a quiet, relaxed place. Beginners sometimes fall asleep during a visualization meditation, so you might try sitting up or standing.  Close your eyes and let your worries drift away. Imagine your restful place. Picture it as vividly as you can--everything you can see, hear, smell, and feel. Visualization works best if you incorporate as many sensory details as possible, using at least three of your senses. When visualizing, choose imagery that appeals to you; don t select images because someone else suggests them, or because you think they should be appealing. Let your own images come up and work for you.  If you are thinking about a dock on a quiet lake, for example:   Walk slowly around the dock and notice the colors and textures around you.   Spend some time exploring each of your senses.   See the sun setting over the water.   Hear the birds singing.   Smell the pine  trees.   Feel the cool water on your bare feet.   Taste the fresh, clean air.   Enjoy the feeling of deep relaxation that envelopes you as you slowly explore your restful place. When you are ready, gently open your eyes and come back to the present.  Don't worry if you sometimes zone out or lose track of where you are during a guided imagery session. This is normal. You may also experience feelings of stiffness or heaviness in your limbs, minor, involuntary muscle-movements, or even cough or yawn. Again, these are normal responses.   Relaxation technique 6: Yoga and benjamin chi for stress relief  Yoga involves a series of both moving and stationary poses, combined with deep breathing. As well as reducing anxiety and stress, yoga can also improve flexibility, strength, balance, and stamina. Practiced regularly, it can also strengthen the relaxation response in your daily life. Since injuries can happen when yoga is practiced incorrectly, it s best to learn by attending group classes, hiring a private teacher, or at least following video instructions.  What type of yoga is best for stress?  Although almost all yoga classes end in a relaxation pose, classes that emphasize slow, steady movement, deep breathing, and gentle stretching are best for stress relief.  Satyananda is a traditional form of yoga. It features gentle poses, deep relaxation, and meditation, making it suitable for beginners as well as anyone primarily looking for stress reduction.   Hatha yoga is also reasonably gentle way to relieve stress and is suitable for beginners. Alternately, look for labels like gentle, for stress relief, or for beginners when selecting a yoga class.   Power yoga, with its intense poses and focus on fitness, is better suited to those looking for stimulation as well as relaxation.   If you re unsure whether a specific yoga class is appropriate for stress relief, call the studio or ask the teacher.  Benjamin chi  If you ve ever seen a  group of people in the park slowly moving in synch, you ve probably witnessed benjamin chi. Benjamin chi is a self-paced, non-competitive series of slow, flowing body movements. These movements emphasize concentration, relaxation, and the conscious circulation of vital energy throughout the body. Though benjamin chi has its roots in martial arts, today it is primarily practiced as a way of calming the mind, conditioning the body, and reducing stress. As in meditation, benjamin chi practitioners focus on their breathing and keeping their attention in the present moment.  Benjamin chi is a safe, low-impact option for people of all ages and levels of fitness, including older adults and those recovering from injuries. Like yoga, once you ve learned the basics of benjamin chi or qi gong, you can practice alone or with others, tailoring your sessions as you see fit.   Making relaxation techniques a part of your life  The best way to start and maintain a relaxation practice is to incorporate it into your daily routine. Between work, family, school, and other commitments, though, it can be tough for many people to find the time. Fortunately, many of the techniques can be practiced while you re doing other things.  Rhythmic exercise as a mindfulness relaxation technique  Rhythmic exercise--such as running, walking, rowing, or cycling--is most effective at relieving stress when performed with relaxation in mind. As with meditation, mindfulness requires being fully engaged in the present moment, focusing your mind on how your body feels right now. As you exercise, focus on the physicality of your body s movement and how your breathing complements that movement. If your mind wanders to other thoughts, gently return to focusing on your breathing and movement.  If walking or running, for example, focus on each step--the sensation of your feet touching the ground, the rhythm of your breath while moving, and the feeling of the wind against your face.  Tips for  fitting relaxation techniques into your life  If possible, schedule a set time to practice each day. Set aside one or two periods each day. You may find that it s easier to stick with your practice if you do it first thing in the morning, before other tasks and responsibilities get in the way.   Practice relaxation techniques while you re doing other things. Meditate while commuting to work on a bus or train, or waiting for a dentist appointment. Try deep breathing while you re doing housework or mowing the lawn. Mindfulness walking can be done while exercising your dog, walking to your car, or climbing the stairs at work instead of using the elevator. Once you ve learned techniques such as duane chi, you can practice them in your office or in the park at lunchtime.   If you exercise, improve the relaxation benefits by adopting mindfulness. Instead of zoning out or staring at a TV as you exercise, try focusing your attention on your body. If you re resistance training, for example, focus on coordinating your breathing with your movements and pay attention to how your body feels as you raise and lower the weights.   Avoid practicing when you re sleepy. These techniques can relax you so much that they can make you very sleepy, especially if it s close to bedtime. You will get the most benefit if you practice when you re fully awake and alert. Do not practice after eating a heavy meal or while using drugs, tobacco, or alcohol.   Expect ups and downs. Don t be discouraged if you skip a few days or even a few weeks. It happens. Just get started again and slowly build up to your old momentum.

## 2019-03-28 VITALS
SYSTOLIC BLOOD PRESSURE: 134 MMHG | WEIGHT: 129.5 LBS | BODY MASS INDEX: 20.81 KG/M2 | RESPIRATION RATE: 20 BRPM | TEMPERATURE: 98.9 F | DIASTOLIC BLOOD PRESSURE: 84 MMHG | HEART RATE: 68 BPM | HEIGHT: 66 IN

## 2019-05-08 ENCOUNTER — TELEPHONE (OUTPATIENT)
Dept: FAMILY MEDICINE | Facility: CLINIC | Age: 64
End: 2019-05-08

## 2019-05-08 NOTE — TELEPHONE ENCOUNTER
"FYI to Dr. Conti:  Message handled by Nurse Triage with [facility] - provider name: Gallito. \"Sounds like gout. Check her temp 3 times a day. Ibuprofen 800mg 3 times daily for 3 days with water. Yep that is a lot See me 9:40 AM tomorrow.\"   Patient denies fever. She said that she has been applying oil of oregano; elevating it; icing it; soaked it in epsom salts last night. She made appointment for tomorrow morning.  Miky Butler RN      "

## 2019-05-09 ENCOUNTER — OFFICE VISIT (OUTPATIENT)
Dept: FAMILY MEDICINE | Facility: CLINIC | Age: 64
End: 2019-05-09
Payer: MEDICARE

## 2019-05-09 DIAGNOSIS — M10.071 ACUTE IDIOPATHIC GOUT INVOLVING TOE OF RIGHT FOOT: Primary | ICD-10-CM

## 2019-05-09 PROCEDURE — 99213 OFFICE O/P EST LOW 20 MIN: CPT | Performed by: FAMILY MEDICINE

## 2019-05-09 ASSESSMENT — MIFFLIN-ST. JEOR: SCORE: 1144.95

## 2019-05-09 NOTE — LETTER
[facility]  [address] [facility] [address]  [phone]        September 16, 2019    Malorie Gill  [address]   [facility] [address]              Dear Malorie Gill    This is to remind you that your lab is due.    You may call our office at [phone] to schedule an appointment.    Please disregard this notice if you have already had your labs drawn or made an appointment.        Sincerely,        Jeramy Conti MD

## 2019-05-09 NOTE — PROGRESS NOTES
"  SUBJECTIVE:   Malorie Gill is a 64 year old female who presents to clinic today for the following   health issues:        Chief Complaint   Patient presents with     Patient Request     pt. is here today in clinic with concerns with right great toe pain and swelling X 3-4 days.      Took high dose ibuprofen and it is better.  Had had 4 d brawue rst in the 4 days before systems started  Additional history: as documented    Reviewed  and updated as needed this visit by clinical staff  Tobacco  Allergies  Med Hx  Surg Hx  Fam Hx  Soc Hx        Reviewed and updated as needed this visit by Provider         Patient Active Problem List   Diagnosis     CARDIOVASCULAR SCREENING; LDL GOAL LESS THAN 160     Advanced directives, counseling/discussion     Health Care Home     Past Surgical History:   Procedure Laterality Date     SALPINGO OOPHORECTOMY,R/L/SHANNON      Laparoscopic LEFT salping-oophorectomy     TUBAL LIGATION      RIGHT tubal ligation       Social History     Tobacco Use     Smoking status: Never Smoker     Smokeless tobacco: Never Used   Substance Use Topics     Alcohol use: Yes     Comment: rare; 2-3 drinks per month or less     Family History   Problem Relation Age of Onset     Hypertension Mother              Cancer Father         prostate     Hypertension Father      Cerebrovascular Disease Father              Other Cancer Father              Diabetes Maternal Grandmother         grandmother         Current Outpatient Medications   Medication Sig Dispense Refill     NO ACTIVE MEDICATIONS .       Allergies   Allergen Reactions     Nka [No Known Allergies]        ROS:  Constitutional, HEENT, cardiovascular, pulmonary, gi and gu systems are negative, except as otherwise noted.    OBJECTIVE:     /80   Pulse 76   Temp 98  F (36.7  C) (Tympanic)   Resp 16   Ht 1.683 m (5' 6.25\")   Wt 58.4 kg (128 lb 12.8 oz)   SpO2 97%   BMI 20.63 kg/m    Body mass index is 20.63 " kg/m .  GENERAL: healthy, alert and no distress  NECK: no adenopathy, no asymmetry, masses, or scars and thyroid normal to palpation  RESP: lungs clear to auscultation - no rales, rhonchi or wheezes  CV: regular rate and rhythm, normal S1 S2, no S3 or S4, no murmur, click or rub, no peripheral edema and peripheral pulses strong  ABDOMEN: soft, nontender, no hepatosplenomegaly, no masses and bowel sounds normal  MS: no gross musculoskeletal defects noted, no edema  Toe first toe pip red hot tender to touch  No spreding not poitn of entr or breakadge of ski.  Diagnostic Test Results:  none     ASSESSMENT/PLAN:     (M10.071) Acute idiopathic gout involving toe of right foot  (primary encounter diagnosis)  Comment: doubt inv fectiopn as it is gettig gabriella.  Will place uric acid ab test in case it returns.   Plan: **Uric acid FUTURE 2mo              Jeramy Conti MD  [facility]

## 2019-05-09 NOTE — LETTER
[facility]  [id number] [place] [id number]  Phone: [phone]      May 9, 2019      RE: Malorie Gill  [address]   [facility] [address]        To whom it may concern:    Malorie Gill is under my professional care.  Please excuse from work.     Sincerely,          Dr. Jeramy Conti

## 2019-05-13 VITALS
HEIGHT: 66 IN | OXYGEN SATURATION: 97 % | WEIGHT: 128.8 LBS | TEMPERATURE: 98 F | RESPIRATION RATE: 16 BRPM | SYSTOLIC BLOOD PRESSURE: 128 MMHG | BODY MASS INDEX: 20.7 KG/M2 | HEART RATE: 76 BPM | DIASTOLIC BLOOD PRESSURE: 80 MMHG

## 2019-06-25 ENCOUNTER — OFFICE VISIT (OUTPATIENT)
Dept: FAMILY MEDICINE | Facility: CLINIC | Age: 64
End: 2019-06-25
Payer: MEDICARE

## 2019-06-25 VITALS
DIASTOLIC BLOOD PRESSURE: 84 MMHG | SYSTOLIC BLOOD PRESSURE: 112 MMHG | BODY MASS INDEX: 20.72 KG/M2 | WEIGHT: 128.9 LBS | OXYGEN SATURATION: 97 % | HEIGHT: 66 IN | TEMPERATURE: 98.5 F | RESPIRATION RATE: 14 BRPM | HEART RATE: 78 BPM

## 2019-06-25 DIAGNOSIS — R05.9 COUGH: Primary | ICD-10-CM

## 2019-06-25 DIAGNOSIS — J20.9 ACUTE BRONCHITIS, UNSPECIFIED ORGANISM: ICD-10-CM

## 2019-06-25 LAB
DEPRECATED S PYO AG THROAT QL EIA: NORMAL
SPECIMEN SOURCE: NORMAL

## 2019-06-25 PROCEDURE — 99213 OFFICE O/P EST LOW 20 MIN: CPT | Performed by: NURSE PRACTITIONER

## 2019-06-25 PROCEDURE — 87880 STREP A ASSAY W/OPTIC: CPT | Performed by: NURSE PRACTITIONER

## 2019-06-25 PROCEDURE — 87081 CULTURE SCREEN ONLY: CPT | Performed by: NURSE PRACTITIONER

## 2019-06-25 ASSESSMENT — MIFFLIN-ST. JEOR: SCORE: 1145.41

## 2019-06-25 NOTE — PROGRESS NOTES
"Subjective     Malorie Gill is a 64 year old female who presents to clinic today for the following health issues:    HPI   RESPIRATORY SYMPTOMS      Duration: 1 week    Description  sore throat, cough, wheezing,   chills, ear pain right, headache,and fatigue/malaise. She also reports \"burning\" in her chest and back.     Severity: moderate    Accompanying signs and symptoms: None    History (predisposing factors):  Patient has history of sinusitis but not for approx 3-4 years. Possibly exposed to strep at work.    Precipitating or alleviating factors: steaming with natural oils.    Therapies tried and outcome:  OTC NSAID Ibuprofen alternated with Tylenol.     Has been using essential oils which has helped some.  Initially getting better but now worsening.  Started as severe sore throat - that has improved some.    Reports no fevers.  No nausea/vomiting   No shortness of breath     Not a smoker.  No history of pneumonia.    Patient Active Problem List   Diagnosis     CARDIOVASCULAR SCREENING; LDL GOAL LESS THAN 160     Advanced directives, counseling/discussion     Health Care Home     Past Surgical History:   Procedure Laterality Date     SALPINGO OOPHORECTOMY,R/L/SHANNON      Laparoscopic LEFT salping-oophorectomy     TUBAL LIGATION      RIGHT tubal ligation       Social History     Tobacco Use     Smoking status: Never Smoker     Smokeless tobacco: Never Used   Substance Use Topics     Alcohol use: Yes     Comment: rare; 2-3 drinks per month or less     Family History   Problem Relation Age of Onset     Hypertension Mother              Cancer Father         prostate     Hypertension Father      Cerebrovascular Disease Father              Other Cancer Father              Diabetes Maternal Grandmother         grandmother         Current Outpatient Medications   Medication Sig Dispense Refill     NO ACTIVE MEDICATIONS .       Allergies   Allergen Reactions     Nka [No Known Allergies]  " "    Recent Labs   Lab Test 11/29/18  1009 04/13/17  1102 12/01/11  0831   LDL 87 68 85   HDL 86 88 57   TRIG 73 75 79   ALT 37 19 43   CR 0.83 0.85 0.92   GFRESTIMATED 69 67 63   GFRESTBLACK 83 81 76   POTASSIUM 3.8 4.1 4.2   TSH  --  1.27 3.06      BP Readings from Last 3 Encounters:   06/25/19 112/84   05/13/19 128/80   03/28/19 134/84    Wt Readings from Last 3 Encounters:   06/25/19 58.5 kg (128 lb 14.4 oz)   05/09/19 58.4 kg (128 lb 12.8 oz)   03/25/19 58.7 kg (129 lb 8 oz)                    Reviewed and updated as needed this visit by Provider         Review of Systems   ROS COMP: Constitutional, HEENT, cardiovascular, pulmonary, GI, , musculoskeletal, neuro, skin, endocrine and psych systems are negative, except as otherwise noted.      Objective    /84 (BP Location: Left arm, Patient Position: Sitting, Cuff Size: Adult Regular)   Pulse 78   Temp 98.5  F (36.9  C) (Tympanic)   Resp 14   Ht 1.683 m (5' 6.25\")   Wt 58.5 kg (128 lb 14.4 oz)   SpO2 97%   BMI 20.65 kg/m    Body mass index is 20.65 kg/m .  Physical Exam   GENERAL: healthy, alert and no distress  HENT: ear canals and TM's normal, nose and mouth without ulcers or lesions  NECK: no adenopathy, no asymmetry, masses, or scars and thyroid normal to palpation  RESP: lungs clear to auscultation - no rales, rhonchi or wheezes, oxygen 97% on room air, no dyspnea.  Dry non productive cough rare heard.  CV: regular rate and rhythm, normal S1 S2, no S3 or S4, no murmur, click or rub, no peripheral edema and peripheral pulses strong  ABDOMEN: soft, nontender, no hepatosplenomegaly, no masses and bowel sounds normal  MS: no gross musculoskeletal defects noted, no edema    Diagnostic Test Results:  Labs reviewed in Epic        Assessment & Plan     1. Cough       2. Acute bronchitis, unspecified organism    Patient requesting antibiotics today as she states \"I know I have an infection and I need an antibiotics.\"  Discussed in detail with patient " "viral vs bacterial infections and that given that VS are stable, she is afebrile, exam is completely unremarkable and cough was rare and dry I would recommend avoiding antibiotics and treating symptoms. Discussed with patient that Bronchitis that is caused by a virus is not treated with antibiotics. Instead, medicines may be given to help relieve symptoms. Symptoms can last up to 2 weeks, although the cough may last much longer.   I did advise patient to call or follow up in clinic if symptoms are not improving by the end of the week or over the next week.  Patient appeared frustrated and continued to ask for antibiotics stating \"I know I need antibiotics, I wouldn't of come in here if it were viral.\" Patient repeatedly interrupted to request antibiotics and I repeated multiple times my decision to treat symptoms based on a viral bronchitis diagnosis.  Patient declined prescription for cough suppressant or any other medications for symptoms medication as she said \"I do not want to take two medications.\"  Patient requested testing for strep which was completed today.          Patient Instructions     Patient Education     Viral Bronchitis (Adult)    You have a viral bronchitis. Bronchitis is inflammation and swelling of the lining of the lungs. This is often caused by an infection. Symptoms include a dry, hacking cough that is worse at night. The cough may bring up yellow-green mucus. You may also feel short of breath or wheeze. Other symptoms may include tiredness, chest discomfort, and chills.  Bronchitis that is caused by a virus is not treated with antibiotics. Instead, medicines may be given to help relieve symptoms. Symptoms can last up to 2 weeks, although the cough may last much longer.  This illness is contagious during the first few days and is spread through the air by coughing and sneezing, or by direct contact (touching the sick person and then touching your own eyes, nose, or mouth).  Most viral " illnesses resolve within 10 to 14 days with rest and simple home remedies, although they may sometimes last for several weeks.  Home care    If symptoms are severe, rest at home for the first 2 to 3 days. When you go back to your usual activities, don't let yourself get too tired.    Do not smoke. Also avoid being exposed to secondhand smoke.    You may use over-the-counter medicine to control fever or pain, unless another pain medicine was prescribed. If you have chronic liver or kidney disease or have ever had a stomach ulcer or gastrointestinal bleeding, talk with your healthcare provider before using these medicines. Also talk to your provider if you are taking medicine to prevent blood clots. Aspirin should never be given to anyone younger than 18 years of age who is ill with a viral infection or fever. It may cause severe liver or brain damage.    Your appetite may be poor, so a light diet is fine. Avoid dehydration by drinking 6 to 8 glasses of fluids per day (such as water, soft drinks, sports drinks, juices, tea, or soup). Extra fluids will help loosen secretions in the nose and lungs.    Over-the-counter cough, cold, and sore-throat medicines will not shorten the length of the illness, but they may help to reduce symptoms. Don't use decongestants if you have high blood pressure.  Follow-up care  Follow up with your healthcare provider, or as advised. If you had an X-ray or ECG (electrocardiogram), a specialist will review it. You will be notified of any new findings that may affect your care.  If you are age 65 or older, or if you have a chronic lung disease or condition that affects your immune system, or you smoke, ask your healthcare provider about getting a pneumococcal vaccine and a yearly flu shot (influenza vaccine).  When to seek medical advice  Call your healthcare provider right away if any of these occur:    Fever of 100.4 F (38 C) or higher, or as directed by your healthcare  provider    Coughing up increased amounts of colored sputum    Weakness, drowsiness, headache, facial pain, ear pain, or a stiff neck  Call 911  Call 911 if any of these occur:    Coughing up blood    Worsening weakness, drowsiness, headache, or stiff neck    Trouble breathing, wheezing, or pain with breathing  Date Last Reviewed: 6/1/2018 2000-2018 [employer]. [address] [place] [address]. All rights reserved. This information is not intended as a substitute for professional medical care. Always follow your healthcare professional's instructions.               No follow-ups on file.    Anabela Quiñonez NP  [facility]

## 2019-06-25 NOTE — NURSING NOTE
"Initial /84 (BP Location: Left arm, Patient Position: Sitting, Cuff Size: Adult Regular)   Pulse 78   Temp 98.5  F (36.9  C) (Tympanic)   Resp 14   Ht 1.683 m (5' 6.25\")   Wt 58.5 kg (128 lb 14.4 oz)   SpO2 97%   BMI 20.65 kg/m   Estimated body mass index is 20.65 kg/m  as calculated from the following:    Height as of this encounter: 1.683 m (5' 6.25\").    Weight as of this encounter: 58.5 kg (128 lb 14.4 oz). .  Celine Moss on 6/25/2019 at 2:28 PM    "

## 2019-06-25 NOTE — PATIENT INSTRUCTIONS
Patient Education     Viral Bronchitis (Adult)    You have a viral bronchitis. Bronchitis is inflammation and swelling of the lining of the lungs. This is often caused by an infection. Symptoms include a dry, hacking cough that is worse at night. The cough may bring up yellow-green mucus. You may also feel short of breath or wheeze. Other symptoms may include tiredness, chest discomfort, and chills.  Bronchitis that is caused by a virus is not treated with antibiotics. Instead, medicines may be given to help relieve symptoms. Symptoms can last up to 2 weeks, although the cough may last much longer.  This illness is contagious during the first few days and is spread through the air by coughing and sneezing, or by direct contact (touching the sick person and then touching your own eyes, nose, or mouth).  Most viral illnesses resolve within 10 to 14 days with rest and simple home remedies, although they may sometimes last for several weeks.  Home care    If symptoms are severe, rest at home for the first 2 to 3 days. When you go back to your usual activities, don't let yourself get too tired.    Do not smoke. Also avoid being exposed to secondhand smoke.    You may use over-the-counter medicine to control fever or pain, unless another pain medicine was prescribed. If you have chronic liver or kidney disease or have ever had a stomach ulcer or gastrointestinal bleeding, talk with your healthcare provider before using these medicines. Also talk to your provider if you are taking medicine to prevent blood clots. Aspirin should never be given to anyone younger than 18 years of age who is ill with a viral infection or fever. It may cause severe liver or brain damage.    Your appetite may be poor, so a light diet is fine. Avoid dehydration by drinking 6 to 8 glasses of fluids per day (such as water, soft drinks, sports drinks, juices, tea, or soup). Extra fluids will help loosen secretions in the nose and  lungs.    Over-the-counter cough, cold, and sore-throat medicines will not shorten the length of the illness, but they may help to reduce symptoms. Don't use decongestants if you have high blood pressure.  Follow-up care  Follow up with your healthcare provider, or as advised. If you had an X-ray or ECG (electrocardiogram), a specialist will review it. You will be notified of any new findings that may affect your care.  If you are age 65 or older, or if you have a chronic lung disease or condition that affects your immune system, or you smoke, ask your healthcare provider about getting a pneumococcal vaccine and a yearly flu shot (influenza vaccine).  When to seek medical advice  Call your healthcare provider right away if any of these occur:    Fever of 100.4 F (38 C) or higher, or as directed by your healthcare provider    Coughing up increased amounts of colored sputum    Weakness, drowsiness, headache, facial pain, ear pain, or a stiff neck  Call 911  Call 911 if any of these occur:    Coughing up blood    Worsening weakness, drowsiness, headache, or stiff neck    Trouble breathing, wheezing, or pain with breathing  Date Last Reviewed: 6/1/2018 2000-2018 [employer]. [address] [place] [address], [place] [address]. All rights reserved. This information is not intended as a substitute for professional medical care. Always follow your healthcare professional's instructions.

## 2019-06-26 LAB
BACTERIA SPEC CULT: NORMAL
SPECIMEN SOURCE: NORMAL

## 2019-10-05 ENCOUNTER — HEALTH MAINTENANCE LETTER (OUTPATIENT)
Age: 64
End: 2019-10-05

## 2019-10-21 ENCOUNTER — TELEPHONE (OUTPATIENT)
Dept: FAMILY MEDICINE | Facility: CLINIC | Age: 64
End: 2019-10-21

## 2019-12-02 ENCOUNTER — OFFICE VISIT (OUTPATIENT)
Dept: FAMILY MEDICINE | Facility: CLINIC | Age: 64
End: 2019-12-02
Payer: MEDICARE

## 2019-12-02 VITALS
SYSTOLIC BLOOD PRESSURE: 130 MMHG | DIASTOLIC BLOOD PRESSURE: 74 MMHG | BODY MASS INDEX: 20.73 KG/M2 | HEART RATE: 67 BPM | OXYGEN SATURATION: 98 % | TEMPERATURE: 98.8 F | HEIGHT: 66 IN | WEIGHT: 129 LBS

## 2019-12-02 DIAGNOSIS — Z00.00 ENCOUNTER FOR MEDICARE ANNUAL WELLNESS EXAM: Primary | ICD-10-CM

## 2019-12-02 DIAGNOSIS — R00.2 PALPITATIONS: ICD-10-CM

## 2019-12-02 LAB
CHOLEST SERPL-MCNC: 169 MG/DL
ERYTHROCYTE [DISTWIDTH] IN BLOOD BY AUTOMATED COUNT: 13 % (ref 10–15)
HCT VFR BLD AUTO: 42.2 % (ref 35–47)
HDLC SERPL-MCNC: 89 MG/DL
HGB BLD-MCNC: 13.6 G/DL (ref 11.7–15.7)
LDLC SERPL CALC-MCNC: 69 MG/DL
MCH RBC QN AUTO: 30.7 PG (ref 26.5–33)
MCHC RBC AUTO-ENTMCNC: 32.2 G/DL (ref 31.5–36.5)
MCV RBC AUTO: 95 FL (ref 78–100)
NONHDLC SERPL-MCNC: 80 MG/DL
PLATELET # BLD AUTO: 243 10E9/L (ref 150–450)
RBC # BLD AUTO: 4.43 10E12/L (ref 3.8–5.2)
TRIGL SERPL-MCNC: 57 MG/DL
TSH SERPL DL<=0.005 MIU/L-ACNC: 1.23 MU/L (ref 0.4–4)
URATE SERPL-MCNC: 4.4 MG/DL (ref 2.6–6)
WBC # BLD AUTO: 5 10E9/L (ref 4–11)

## 2019-12-02 PROCEDURE — 93000 ELECTROCARDIOGRAM COMPLETE: CPT | Mod: 59 | Performed by: FAMILY MEDICINE

## 2019-12-02 PROCEDURE — 80061 LIPID PANEL: CPT | Performed by: FAMILY MEDICINE

## 2019-12-02 PROCEDURE — 84550 ASSAY OF BLOOD/URIC ACID: CPT | Performed by: FAMILY MEDICINE

## 2019-12-02 PROCEDURE — 36415 COLL VENOUS BLD VENIPUNCTURE: CPT | Performed by: FAMILY MEDICINE

## 2019-12-02 PROCEDURE — G0402 INITIAL PREVENTIVE EXAM: HCPCS | Performed by: FAMILY MEDICINE

## 2019-12-02 PROCEDURE — 85027 COMPLETE CBC AUTOMATED: CPT | Performed by: FAMILY MEDICINE

## 2019-12-02 PROCEDURE — 84443 ASSAY THYROID STIM HORMONE: CPT | Performed by: FAMILY MEDICINE

## 2019-12-02 ASSESSMENT — MIFFLIN-ST. JEOR: SCORE: 1145.86

## 2019-12-02 ASSESSMENT — PAIN SCALES - GENERAL: PAINLEVEL: NO PAIN (0)

## 2019-12-02 NOTE — PROGRESS NOTES
"  SUBJECTIVE:   Malorie Gill is a 64 year old female who presents for Preventive Visit.    Are you in the first 12 months of your Medicare Part B coverage?  Yes,  Visual Acuity:  Right Eye: 20/50   Left Eye: 20/20  Both Eyes: 20/16. She was wearing her glasses.     Physical Health:    In general, how would you rate your overall physical health? good    Outside of work, how many days during the week do you exercise? 6-7 days/week    Outside of work, approximately how many minutes a day do you exercise?15-30 minutes    If you drink alcohol do you typically have >3 drinks per day or >7 drinks per week? No    Do you usually eat at least 4 servings of fruit and vegetables a day, include whole grains & fiber and avoid regularly eating high fat or \"junk\" foods? Yes    Do you have any problems taking medications regularly?  No    Do you have any side effects from medications? not applicable    Needs assistance for the following daily activities: no assistance needed    Which of the following safety concerns are present in your home?  none identified     Hearing impairment: No    In the past 6 months, have you been bothered by leaking of urine? no    Mental Health:    In general, how would you rate your overall mental or emotional health? good  PHQ-2 Score:      Do you feel safe in your environment? Yes    Have you ever done Advance Care Planning? (For example, a Health Directive, POLST, or a discussion with a medical provider or your loved ones about your wishes): Yes, patient states has an Advance Care Planning document and will bring a copy to the clinic.    Additional concerns to address?  No    Fall risk:  Fallen 2 or more times in the past year?: No  Any fall with injury in the past year?: No  Cognitive Screenin) Repeat 3 items (Leader, Season, Table)    2) Clock draw: NORMAL  3) 3 item recall: Recalls 3 objects  Results: 3 items recalled: COGNITIVE IMPAIRMENT LESS LIKELY    Mini-CogTM Copyright S Terra. " Licensed by the author for use in [facility]; reprinted with permission ([email or web address]). All rights reserved.      Do you have sleep apnea, excessive snoring or daytime drowsiness?: no    Reviewed and updated as needed this visit by clinical staff  Tobacco  Allergies  Meds  Med Hx  Surg Hx  Fam Hx  Soc Hx      viewed and updated as needed this visit by Provider        Social History     Tobacco Use     Smoking status: Never Smoker     Smokeless tobacco: Never Used   Substance Use Topics     Alcohol use: Yes     Comment: rare; 2-3 drinks per month or less                           Current providers sharing in care for this patient include:   Patient Care Team:  Jeramy Conti MD as PCP - General  Jeramy Conti MD as Assigned PCP    The following health maintenance items are reviewed in Epic and correct as of today:  Health Maintenance   Topic Date Due     DEXA  1955     MAMMO SCREENING  1955     DTAP/TDAP/TD IMMUNIZATION (1 - Tdap) 04/12/1964     ZOSTER IMMUNIZATION (1 of 2) 04/12/2003     PNEUMOCOCCAL IMMUNIZATION 65+ LOW/MEDIUM RISK (1 of 2 - PCV13) 04/12/2018     ADVANCE CARE PLANNING  12/09/2018     INFLUENZA VACCINE (1) 09/01/2019     FALL RISK ASSESSMENT  11/29/2019     MEDICARE ANNUAL WELLNESS VISIT  11/29/2019     LIPID  11/29/2023     COLONOSCOPY  04/28/2027     HEPATITIS C SCREENING  Completed     PHQ-2  Completed     IPV IMMUNIZATION  Aged Out     MENINGITIS IMMUNIZATION  Aged Out     Labs reviewed in EPIC  BP Readings from Last 3 Encounters:   12/02/19 130/74   06/25/19 112/84   05/13/19 128/80    Wt Readings from Last 3 Encounters:   12/02/19 58.5 kg (129 lb)   06/25/19 58.5 kg (128 lb 14.4 oz)   05/09/19 58.4 kg (128 lb 12.8 oz)             Patient Active Problem List   Diagnosis     CARDIOVASCULAR SCREENING; LDL GOAL LESS THAN 160     Advanced directives, counseling/discussion     Health Care Home     Past Surgical History:   Procedure Laterality Date     SALPINGO  "OOPHORECTOMY,R/L/SHANNON      Laparoscopic LEFT salping-oophorectomy     TUBAL LIGATION  2000    RIGHT tubal ligation       Social History     Tobacco Use     Smoking status: Never Smoker     Smokeless tobacco: Never Used   Substance Use Topics     Alcohol use: Yes     Comment: rare; 2-3 drinks per month or less     Family History   Problem Relation Age of Onset     Hypertension Mother              Cancer Father         prostate     Hypertension Father      Cerebrovascular Disease Father              Other Cancer Father              Diabetes Maternal Grandmother         grandmother         Current Outpatient Medications   Medication Sig Dispense Refill     NO ACTIVE MEDICATIONS .       Allergies   Allergen Reactions     Nka [No Known Allergies]      ROS:  Constitutional, HEENT, cardiovascular, pulmonary, gi and gu systems are negative, except as otherwise noted.    OBJECTIVE:   /74   Pulse 67   Temp 98.8  F (37.1  C) (Tympanic)   Ht 1.683 m (5' 6.25\")   Wt 58.5 kg (129 lb)   SpO2 98%   BMI 20.66 kg/m   Estimated body mass index is 20.66 kg/m  as calculated from the following:    Height as of this encounter: 1.683 m (5' 6.25\").    Weight as of this encounter: 58.5 kg (129 lb).     EXAM:   GENERAL: healthy, alert and no distress  NECK: no adenopathy, no asymmetry, masses, or scars and thyroid normal to palpation  RESP: lungs clear to auscultation - no rales, rhonchi or wheezes  CV: regular rate and rhythm, normal S1 S2, no S3 or S4, no murmur, click or rub, no peripheral edema and peripheral pulses strong  ABDOMEN: soft, nontender, no hepatosplenomegaly, no masses and bowel sounds normal  MS: no gross musculoskeletal defects noted, no edema    Diagnostic Test Results:  Labs reviewed in Epic    ASSESSMENT / PLAN:   1. Encounter for Medicare annual wellness exam    (Z00.00) Encounter for Medicare annual wellness exam  (primary encounter diagnosis)    Plan: Lipid panel reflex to direct " "LDL Fasting, Uric         acid, EKG 12-lead complete w/read - Clinics            (R00.2) Palpitations  Good control.  Continue currant medications, continue to monito most likely pvc's related to cafinee.       COUNSELING:  Reviewed preventive health counseling, as reflected in patient instructions       Regular exercise       Healthy diet/nutrition       Vision screening       Hearing screening       Colon cancer screening       Prostate cancer screening    Estimated body mass index is 20.66 kg/m  as calculated from the following:    Height as of this encounter: 1.683 m (5' 6.25\").    Weight as of this encounter: 58.5 kg (129 lb).    Weight management plan: Discussed healthy diet and exercise guidelines     reports that she has never smoked. She has never used smokeless tobacco.    Appropriate preventive services were discussed with this patient, including applicable screening as appropriate for cardiovascular disease, diabetes, osteopenia/osteoporosis, and glaucoma.  As appropriate for age/gender, discussed screening for colorectal cancer, prostate cancer, breast cancer, and cervical cancer. Checklist reviewing preventive services available has been given to the patient.    Reviewed patients plan of care and provided an AVS. The Intermediate Care Plan ( asthma action plan, low back pain action plan, and migraine action plan) for Malorie meets the Care Plan requirement. This Care Plan has been established and reviewed with the Patient.    Counseling Resources:  ATP IV Guidelines  Pooled Cohorts Equation Calculator  Breast Cancer Risk Calculator  FRAX Risk Assessment  ICSI Preventive Guidelines  Dietary Guidelines for Americans, 2010  USDA's MyPlate  ASA Prophylaxis  Lung CA Screening    Jeramy Conti MD  [facility]  "

## 2019-12-02 NOTE — PATIENT INSTRUCTIONS
Patient Education   Personalized Prevention Plan  You are due for the preventive services outlined below.  Your care team is available to assist you in scheduling these services.  If you have already completed any of these items, please share that information with your care team to update in your medical record.  Health Maintenance Due   Topic Date Due     Osteoporosis Screening  1955     Mammogram  1955     Diptheria Tetanus Pertussis (DTAP/TDAP/TD) Vaccine (1 - Tdap) 04/12/1964     Zoster (Shingles) Vaccine (1 of 2) 04/12/2003     Pneumococcal Vaccine (1 of 2 - PCV13) 04/12/2018     Discuss Advance Care Planning  12/09/2018     Flu Vaccine (1) 09/01/2019     FALL RISK ASSESSMENT  11/29/2019     Annual Wellness Visit  11/29/2019

## 2020-04-13 ENCOUNTER — MYC MEDICAL ADVICE (OUTPATIENT)
Dept: FAMILY MEDICINE | Facility: CLINIC | Age: 65
End: 2020-04-13

## 2020-04-13 NOTE — TELEPHONE ENCOUNTER
Pt called and left voicemail and would like a call back from Dr Conti care team. Please advise. Thank you. I see pt also sent a mychart msg.     Roxy Kyle, [place] .

## 2020-04-13 NOTE — LETTER
[facility]  [id number] [place] [id number]  Phone: [phone]      April 15, 2020      RE: Malorie Gill  [address]   [facility] [address]        To whom it may concern:    Malorie Gill is under my professional care.  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    Sincerely,        Dr. Jeramy Conti

## 2020-11-11 ENCOUNTER — TELEPHONE (OUTPATIENT)
Dept: FAMILY MEDICINE | Facility: CLINIC | Age: 65
End: 2020-11-11

## 2020-11-11 NOTE — TELEPHONE ENCOUNTER
All I can do is get you a letter that you are high risk for complications of covid 19 and you need to maintain strict social distancing.     My experience is only those involved in close contact with intubation have been able to miss out of call.        Letter is ready.     Jeramy

## 2020-11-11 NOTE — TELEPHONE ENCOUNTER
Reason for Call:  Other Letter requested    Detailed comments: Pat is currently on call .  Dr. Conti has already written a letter that she should not care for COVID patients due to her family situation.  Now when she is on call she could get called in to care for a covid positive patient.  She was hoping to get a letter excusing her from on call until covid has cleared - maybe a year or longer.  Can call her if you have questions.    Please review and advise. Thank you..Eusebia De Los Santos    Phone Number Patient can be reached at: Cell number on file:    Telephone Information:   Mobile [phone]       Best Time: any time    Can we leave a detailed message on this number? YES    Call taken on 11/11/2020 at 3:04 PM by Eusebia De Los Santos

## 2020-11-11 NOTE — LETTER
[facility]  [address]  [facility] [address]  Phone: [phone]      November 11, 2020      RE: Malorie Gill  [address]   [facility] [address]        To whom it may concern:    Malorie Gill is under my professional care.  She has high risk of serious complications from covid 19.  She should maintain strict social distancing.     Sincerely,        Dr. Jeramy Conti

## 2020-11-12 NOTE — TELEPHONE ENCOUNTER
Providers have lukas asked to not include specifics like that.      We have specifically been told not to write letters about potential exposure situations.     I have rebelled a little by explaining when my patients are at higher risk.

## 2020-11-12 NOTE — TELEPHONE ENCOUNTER
Spoke with Pat about her letter and she would like it changed a bit if possible.  She'd like it to state:    She should maintain strict social distancing from any positive, symptomatic and PUI COVID patients.      Please redo letter if in agreement.      Thank you..Eusebia De Los Santos

## 2020-11-14 ENCOUNTER — HEALTH MAINTENANCE LETTER (OUTPATIENT)
Age: 65
End: 2020-11-14

## 2020-12-29 ENCOUNTER — NURSE TRIAGE (OUTPATIENT)
Dept: FAMILY MEDICINE | Facility: CLINIC | Age: 65
End: 2020-12-29
Payer: MEDICARE

## 2020-12-29 DIAGNOSIS — R07.9 CHEST PAIN, UNSPECIFIED TYPE: Primary | ICD-10-CM

## 2020-12-29 PROCEDURE — 99213 OFFICE O/P EST LOW 20 MIN: CPT | Performed by: FAMILY MEDICINE

## 2020-12-29 NOTE — TELEPHONE ENCOUNTER
"Pt reports she is having mild pain, In between shoulder blades that radiates to the upper mid chest. She states it is mostly constant,  for a few weeks.   Pain level 3-5/10. Can feel a little \"spacey\" at times.   She states her heart flutters every once in awhile.   She stopped caffeine to see if would help.     No coughing. Doesn't hurt to take deep breath.   BP has been elevated, 140-150s/80s. Did have one reading of 180/110. O2 sat is normal.     Advised her to go to ED per protocol, chest pain, and heart flutters. She declines. She already scheduled appt for Thursday. She states if pain worsens, she will go to ED.   FYI for provider.       Reason for Disposition    Heart beating irregularly or very rapidly    Additional Information    Negative: Passed out (i.e., fainted, collapsed and was not responding)    Negative: Shock suspected (e.g., cold/pale/clammy skin, too weak to stand, low BP, rapid pulse)    Negative: Sounds like a life-threatening emergency to the triager    Negative: Major injury to the back (e.g., MVA, fall > 10 feet or 3 meters, penetrating injury, etc.)    Pain in the upper back over the ribs (rib cage) that radiates (travels) into the chest    Negative: Severe difficulty breathing (e.g., struggling for each breath, speaks in single words)    Negative: Passed out (i.e., fainted, collapsed and was not responding)    Negative: Chest pain lasting longer than 5 minutes and ANY of the following:* Over 50 years old* Over 30 years old and at least one cardiac risk factor (i.e., high blood pressure, diabetes, high cholesterol, obesity, smoker or strong family history of heart disease)* Pain is crushing, pressure-like, or heavy * Took nitroglycerin and chest pain was not relieved* History of heart disease (i.e., angina, heart attack, bypass surgery, angioplasty, CHF)    Negative: Visible sweat on face or sweat dripping down face    Negative: Sounds like a life-threatening emergency to the triager    " Negative: Followed an injury to chest    Negative: SEVERE chest pain    Negative: Pain also present in shoulder(s) or arm(s) or jaw    Negative: Difficulty breathing    Negative: Cocaine use within last 3 days    Negative: History of prior 'blood clot' in leg or lungs (i.e., deep vein thrombosis, pulmonary embolism)    Negative: Recent illness requiring prolonged bed rest (i.e., immobilization)    Negative: Hip or leg fracture in past 2 months (e.g, or had cast on leg or ankle)    Negative: Major surgery in the past month    Negative: Recent long-distance travel with prolonged time in car, bus, plane, or train (i.e., within past 2 weeks; 6 or more hours duration)    Protocols used: CHEST PAIN-A-OH, BACK PAIN-A-OH

## 2020-12-31 ASSESSMENT — ACTIVITIES OF DAILY LIVING (ADL): CURRENT_FUNCTION: NO ASSISTANCE NEEDED

## 2020-12-31 ASSESSMENT — ENCOUNTER SYMPTOMS
ARTHRALGIAS: 1
BREAST MASS: 0

## 2021-01-06 ENCOUNTER — OFFICE VISIT (OUTPATIENT)
Dept: FAMILY MEDICINE | Facility: CLINIC | Age: 66
End: 2021-01-06
Payer: COMMERCIAL

## 2021-01-06 VITALS
SYSTOLIC BLOOD PRESSURE: 152 MMHG | WEIGHT: 133.4 LBS | BODY MASS INDEX: 21.44 KG/M2 | HEART RATE: 75 BPM | HEIGHT: 66 IN | RESPIRATION RATE: 16 BRPM | TEMPERATURE: 98 F | DIASTOLIC BLOOD PRESSURE: 97 MMHG

## 2021-01-06 DIAGNOSIS — Z13.220 LIPID SCREENING: ICD-10-CM

## 2021-01-06 DIAGNOSIS — E55.9 VITAMIN D DEFICIENCY: ICD-10-CM

## 2021-01-06 DIAGNOSIS — R07.89 CHEST WALL PAIN: ICD-10-CM

## 2021-01-06 DIAGNOSIS — Z00.00 ENCOUNTER FOR MEDICARE ANNUAL WELLNESS EXAM: Primary | ICD-10-CM

## 2021-01-06 LAB
ALBUMIN UR-MCNC: NEGATIVE MG/DL
ANION GAP SERPL CALCULATED.3IONS-SCNC: 2 MMOL/L (ref 3–14)
APPEARANCE UR: CLEAR
BILIRUB UR QL STRIP: NEGATIVE
BUN SERPL-MCNC: 13 MG/DL (ref 7–30)
CALCIUM SERPL-MCNC: 8.9 MG/DL (ref 8.5–10.1)
CHLORIDE SERPL-SCNC: 105 MMOL/L (ref 94–109)
CHOLEST SERPL-MCNC: 185 MG/DL
CO2 SERPL-SCNC: 31 MMOL/L (ref 20–32)
COLOR UR AUTO: YELLOW
CREAT SERPL-MCNC: 0.79 MG/DL (ref 0.52–1.04)
ERYTHROCYTE [DISTWIDTH] IN BLOOD BY AUTOMATED COUNT: 13.1 % (ref 10–15)
GFR SERPL CREATININE-BSD FRML MDRD: 77 ML/MIN/{1.73_M2}
GLUCOSE SERPL-MCNC: 90 MG/DL (ref 70–99)
GLUCOSE UR STRIP-MCNC: NEGATIVE MG/DL
HCT VFR BLD AUTO: 42 % (ref 35–47)
HDLC SERPL-MCNC: 97 MG/DL
HGB BLD-MCNC: 13.6 G/DL (ref 11.7–15.7)
HGB UR QL STRIP: ABNORMAL
KETONES UR STRIP-MCNC: NEGATIVE MG/DL
LDLC SERPL CALC-MCNC: 76 MG/DL
LEUKOCYTE ESTERASE UR QL STRIP: NEGATIVE
MAGNESIUM SERPL-MCNC: 2.1 MG/DL (ref 1.6–2.3)
MCH RBC QN AUTO: 30.6 PG (ref 26.5–33)
MCHC RBC AUTO-ENTMCNC: 32.4 G/DL (ref 31.5–36.5)
MCV RBC AUTO: 95 FL (ref 78–100)
NITRATE UR QL: NEGATIVE
NON-SQ EPI CELLS #/AREA URNS LPF: NORMAL /LPF
NONHDLC SERPL-MCNC: 88 MG/DL
PH UR STRIP: 6.5 PH (ref 5–7)
PLATELET # BLD AUTO: 253 10E9/L (ref 150–450)
POTASSIUM SERPL-SCNC: 3.8 MMOL/L (ref 3.4–5.3)
RBC # BLD AUTO: 4.44 10E12/L (ref 3.8–5.2)
RBC #/AREA URNS AUTO: NORMAL /HPF
SODIUM SERPL-SCNC: 138 MMOL/L (ref 133–144)
SOURCE: ABNORMAL
SP GR UR STRIP: 1.01 (ref 1–1.03)
TRIGL SERPL-MCNC: 60 MG/DL
UROBILINOGEN UR STRIP-ACNC: 0.2 EU/DL (ref 0.2–1)
WBC # BLD AUTO: 6.1 10E9/L (ref 4–11)
WBC #/AREA URNS AUTO: NORMAL /HPF

## 2021-01-06 PROCEDURE — 82306 VITAMIN D 25 HYDROXY: CPT | Performed by: FAMILY MEDICINE

## 2021-01-06 PROCEDURE — G0439 PPPS, SUBSEQ VISIT: HCPCS | Performed by: FAMILY MEDICINE

## 2021-01-06 PROCEDURE — 83735 ASSAY OF MAGNESIUM: CPT | Performed by: FAMILY MEDICINE

## 2021-01-06 PROCEDURE — 36415 COLL VENOUS BLD VENIPUNCTURE: CPT | Performed by: FAMILY MEDICINE

## 2021-01-06 PROCEDURE — 80048 BASIC METABOLIC PNL TOTAL CA: CPT | Performed by: FAMILY MEDICINE

## 2021-01-06 PROCEDURE — 99213 OFFICE O/P EST LOW 20 MIN: CPT | Mod: 25 | Performed by: FAMILY MEDICINE

## 2021-01-06 PROCEDURE — 80061 LIPID PANEL: CPT | Performed by: FAMILY MEDICINE

## 2021-01-06 PROCEDURE — 85027 COMPLETE CBC AUTOMATED: CPT | Performed by: FAMILY MEDICINE

## 2021-01-06 PROCEDURE — 93000 ELECTROCARDIOGRAM COMPLETE: CPT | Performed by: FAMILY MEDICINE

## 2021-01-06 PROCEDURE — 81001 URINALYSIS AUTO W/SCOPE: CPT | Performed by: FAMILY MEDICINE

## 2021-01-06 ASSESSMENT — PAIN SCALES - GENERAL: PAINLEVEL: NO PAIN (0)

## 2021-01-06 ASSESSMENT — MIFFLIN-ST. JEOR: SCORE: 1160.82

## 2021-01-06 NOTE — PROGRESS NOTES
"  SUBJECTIVE:   Malorie Gill is a 65 year old female who presents for Preventive Visit.    Patient has been advised of split billing requirements and indicates understanding: Yes     Are you in the first 12 months of your Medicare Part B coverage?  No    Annual Exam:  In general, how would you rate your overall physical health?: good  Frequency of exercise:: 1 day/week  Do you usually eat at least 4 servings of fruit and vegetables a day, include whole grains & fiber, and avoid regularly eating high fat or \"junk\" foods? : Yes  Taking medications regularly:: Not Applicable  Medication side effects:: Not applicable  Activities of Daily Living: no assistance needed  Home safety: no safety concerns identified  Hearing Impairment:: difficulty following a conversation in a noisy restaurant or crowded room, feel that people are mumbling or not speaking clearly, difficulty understanding soft or whispered speech  In the past 6 months, have you been bothered by leaking of urine?: No  chest pain: Yes  hearing loss: Yes  arthralgias: Yes  pelvic pain: No  vaginal bleeding: No  vaginal discharge: No  tenderness: No  breast mass: No  breast discharge: No  In general, how would you rate your overall mental or emotional health?: good  Additional concerns today:: No  Duration of exercise:: 30-45 minutes      Mental Health:  PHQ-2 Score: (P) 0    Do you feel safe in your environment? Yes    Have you ever done Advance Care Planning? (For example, a Health Directive, POLST, or a discussion with a medical provider or your loved ones about your wishes): Yes, patient states has an Advance Care Planning document and will bring a copy to the clinic.    Additional concerns to address?  YES. She is having some chest and back pain she would like to discuss. She was triaged.     Fall risk:  Fallen 2 or more times in the past year?: No  Any fall with injury in the past year?: No    Cognitive Screenin) Repeat 3 items (Leader, Season, " Table)    2) Clock draw: NORMAL  3) 3 item recall: Recalls 3 objects  Results: 3 items recalled, normal clock: COGNITIVE IMPAIRMENT LESS LIKELY    Mini-CogTM Copyright TREY Ellison. Licensed by the author for use in [facility]; reprinted with permission ([email or web address]). All rights reserved.      Do you have sleep apnea, excessive snoring or daytime drowsiness?: no      Reviewed and updated as needed this visit by clinical staff  Tobacco  Allergies  Meds   Med Hx  Surg Hx  Fam Hx  Soc Hx        Reviewed and updated as needed this visit by Provider                Social History     Tobacco Use     Smoking status: Never Smoker     Smokeless tobacco: Never Used   Substance Use Topics     Alcohol use: Yes     Comment: rare; 2-3 drinks per month or less                           Current providers sharing in care for this patient include:   Patient Care Team:  Jeramy Conti MD as PCP - General  Jeramy Conti MD as Assigned PCP    The following health maintenance items are reviewed in Epic and correct as of today:  Health Maintenance   Topic Date Due     DEXA  1955     MAMMO SCREENING  1955     DTAP/TDAP/TD IMMUNIZATION (1 - Tdap) 04/12/1978     ZOSTER IMMUNIZATION (1 of 2) 04/12/2003     Pneumococcal Vaccine: 65+ Years (1 of 1 - PPSV23) 04/12/2018     INFLUENZA VACCINE (1) 09/01/2020     FALL RISK ASSESSMENT  12/02/2020     MEDICARE ANNUAL WELLNESS VISIT  01/06/2022     LIPID  12/02/2024     ADVANCE CARE PLANNING  01/06/2026     COLORECTAL CANCER SCREENING  04/28/2027     HEPATITIS C SCREENING  Completed     PHQ-2  Completed     Pneumococcal Vaccine: Pediatrics (0 to 5 Years) and At-Risk Patients (6 to 64 Years)  Aged Out     IPV IMMUNIZATION  Aged Out     MENINGITIS IMMUNIZATION  Aged Out     HEPATITIS B IMMUNIZATION  Aged Out     Patient Active Problem List   Diagnosis     CARDIOVASCULAR SCREENING; LDL GOAL LESS THAN 160     Advanced directives, counseling/discussion     Health Care Home  "    Past Surgical History:   Procedure Laterality Date     SALPINGO OOPHORECTOMY,R/L/SHANNON      Laparoscopic LEFT salping-oophorectomy     TUBAL LIGATION  2000    RIGHT tubal ligation       Social History     Tobacco Use     Smoking status: Never Smoker     Smokeless tobacco: Never Used   Substance Use Topics     Alcohol use: Yes     Comment: rare; 2-3 drinks per month or less     Family History   Problem Relation Age of Onset     Hypertension Mother              Cancer Father         prostate     Hypertension Father      Cerebrovascular Disease Father              Other Cancer Father         brain     Diabetes Maternal Grandmother         grandmother         Current Outpatient Medications   Medication Sig Dispense Refill     NO ACTIVE MEDICATIONS .       Allergies   Allergen Reactions     Nka [No Known Allergies]        ROS:  Constitutional, HEENT, cardiovascular, pulmonary, gi and gu systems are negative, except as otherwise noted.    OBJECTIVE:   BP (!) 152/97   Pulse 75   Temp 98  F (36.7  C) (Tympanic)   Resp 16   Ht 1.683 m (5' 6.25\")   Wt 60.5 kg (133 lb 6.4 oz)   BMI 21.37 kg/m   Estimated body mass index is 21.37 kg/m  as calculated from the following:    Height as of this encounter: 1.683 m (5' 6.25\").    Weight as of this encounter: 60.5 kg (133 lb 6.4 oz).  EXAM:   GENERAL: healthy, alert and no distress  NECK: no adenopathy, no asymmetry, masses, or scars and thyroid normal to palpation  RESP: lungs clear to auscultation - no rales, rhonchi or wheezes  CV: regular rate and rhythm, normal S1 S2, no S3 or S4, no murmur, click or rub, no peripheral edema and peripheral pulses strong  ABDOMEN: soft, nontender, no hepatosplenomegaly, no masses and bowel sounds normal  MS: no gross musculoskeletal defects noted, no edema    Diagnostic Test Results:  Labs reviewed in Epic    ASSESSMENT / PLAN:   1. Encounter for Medicare annual wellness exam    2. Vitamin D deficiency    3. Chest wall " "pain  Discussed posture  Which I feel is a major contributor  Stretching     Patient has been advised of split billing requirements and indicates understanding: Yes    COUNSELING:  Reviewed preventive health counseling, as reflected in patient instructions       Regular exercise       Healthy diet/nutrition       Vision screening       Colon cancer screening       Prostate cancer screening    Estimated body mass index is 21.37 kg/m  as calculated from the following:    Height as of this encounter: 1.683 m (5' 6.25\").    Weight as of this encounter: 60.5 kg (133 lb 6.4 oz).    Weight management plan: Discussed healthy diet and exercise guidelines    She reports that she has never smoked. She has never used smokeless tobacco.    Appropriate preventive services were discussed with this patient, including applicable screening as appropriate for cardiovascular disease, diabetes, osteopenia/osteoporosis, and glaucoma.  As appropriate for age/gender, discussed screening for colorectal cancer, prostate cancer, breast cancer, and cervical cancer. Checklist reviewing preventive services available has been given to the patient.    Reviewed patients plan of care and provided an AVS. The Intermediate Care Plan ( asthma action plan, low back pain action plan, and migraine action plan) for Malorie meets the Care Plan requirement. This Care Plan has been established and reviewed with the Patient.    Counseling Resources:  ATP IV Guidelines  Pooled Cohorts Equation Calculator  Breast Cancer Risk Calculator  BRCA-Related Cancer Risk Assessment: FHS-7 Tool  FRAX Risk Assessment  ICSI Preventive Guidelines  Dietary Guidelines for Americans, 2010  USDA's MyPlate  ASA Prophylaxis  Lung CA Screening    Jeramy Conti MD  [facility]  "

## 2021-01-06 NOTE — PATIENT INSTRUCTIONS
Patient Education   Personalized Prevention Plan  You are due for the preventive services outlined below.  Your care team is available to assist you in scheduling these services.  If you have already completed any of these items, please share that information with your care team to update in your medical record.  Health Maintenance Due   Topic Date Due     Osteoporosis Screening  1955     Mammogram  1955     Diptheria Tetanus Pertussis (DTAP/TDAP/TD) Vaccine (1 - Tdap) 04/12/1978     Zoster (Shingles) Vaccine (1 of 2) 04/12/2003     Pneumococcal Vaccine (1 of 1 - PPSV23) 04/12/2018     Flu Vaccine (1) 09/01/2020     FALL RISK ASSESSMENT  12/02/2020

## 2021-01-07 ENCOUNTER — HOSPITAL ENCOUNTER (OUTPATIENT)
Dept: CARDIOLOGY | Facility: CLINIC | Age: 66
Discharge: HOME | End: 2021-01-07
Attending: FAMILY MEDICINE | Admitting: FAMILY MEDICINE
Payer: COMMERCIAL

## 2021-01-07 DIAGNOSIS — R07.9 CHEST PAIN, UNSPECIFIED TYPE: ICD-10-CM

## 2021-01-07 LAB — DEPRECATED CALCIDIOL+CALCIFEROL SERPL-MC: 42 UG/L (ref 20–75)

## 2021-01-07 PROCEDURE — 93321 DOPPLER ECHO F-UP/LMTD STD: CPT | Mod: 26 | Performed by: INTERNAL MEDICINE

## 2021-01-07 PROCEDURE — 93350 STRESS TTE ONLY: CPT | Mod: 26 | Performed by: INTERNAL MEDICINE

## 2021-01-07 PROCEDURE — 93325 DOPPLER ECHO COLOR FLOW MAPG: CPT | Mod: 26 | Performed by: INTERNAL MEDICINE

## 2021-01-07 PROCEDURE — 93016 CV STRESS TEST SUPVJ ONLY: CPT | Performed by: INTERNAL MEDICINE

## 2021-01-07 PROCEDURE — 999N000208 ECHO STRESS ECHOCARDIOGRAM

## 2021-01-07 PROCEDURE — 93018 CV STRESS TEST I&R ONLY: CPT | Performed by: INTERNAL MEDICINE

## 2021-01-07 PROCEDURE — 255N000002 HC RX 255 OP 636: Performed by: FAMILY MEDICINE

## 2021-01-07 RX ADMIN — HUMAN ALBUMIN MICROSPHERES AND PERFLUTREN 2 ML: 10; .22 INJECTION, SOLUTION INTRAVENOUS at 09:12

## 2021-08-23 ENCOUNTER — VIRTUAL VISIT (OUTPATIENT)
Dept: FAMILY MEDICINE | Facility: CLINIC | Age: 66
End: 2021-08-23
Payer: COMMERCIAL

## 2021-08-23 DIAGNOSIS — Z28.21 COVID-19 VIRUS VACCINATION DECLINED: Primary | ICD-10-CM

## 2021-08-23 PROCEDURE — 99212 OFFICE O/P EST SF 10 MIN: CPT | Mod: TEL | Performed by: FAMILY MEDICINE

## 2021-08-23 NOTE — PROGRESS NOTES
Kristen is a 66 year old who is being evaluated via a billable telephone visit.      What phone number would you like to be contacted at? [phone]  How would you like to obtain your AVS? MyChart    Assessment & Plan     There are no diagnoses linked to this encounter.        No follow-ups on file.    Jeramy Conti MD  [facility]    Subjective   Kristen is a 66 year old who presents for the following health issues     HPI   Chief Complaint   Patient presents with     Consult     Patient would like to discuss some questions she has about the COVID vaccine with Dr. Conti           Review of Systems   Constitutional, HEENT, cardiovascular, pulmonary, gi and gu systems are negative, except as otherwise noted.      Objective           Vitals:  No vitals were obtained today due to virtual visit.    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  She joyner have deep longstanding beliefs that cuase her to caflict with vaccine.  I discussed that we now know a lot about the covid vaccines and I don't believe there will be many suprses to come from this vaccine.  I recommended knowing her belofs the pfizer vaccine if she does decide to get the vaccine.             Phone call duration: 7 minutes

## 2021-09-12 ENCOUNTER — HEALTH MAINTENANCE LETTER (OUTPATIENT)
Age: 66
End: 2021-09-12

## 2021-11-07 ENCOUNTER — HEALTH MAINTENANCE LETTER (OUTPATIENT)
Age: 66
End: 2021-11-07

## 2022-02-27 ENCOUNTER — HEALTH MAINTENANCE LETTER (OUTPATIENT)
Age: 67
End: 2022-02-27

## 2022-06-22 ENCOUNTER — TELEPHONE (OUTPATIENT)
Dept: FAMILY MEDICINE | Facility: CLINIC | Age: 67
End: 2022-06-22

## 2022-06-22 DIAGNOSIS — Z12.31 VISIT FOR SCREENING MAMMOGRAM: Primary | ICD-10-CM

## 2022-06-22 NOTE — TELEPHONE ENCOUNTER
Patient Quality Outreach    Patient is due for the following:   Breast Cancer Screening - Mammogram  Physical  - Due after 01/06/2022  Immunizations  -  Covid, Pneumococcal, Tdap and Zoster    NEXT STEPS:   Schedule a yearly physical and mammogram    Type of outreach:    Phone, spoke to patient/parent. she is at work and is going to have to call back and schedule.    Questions for provider review:    None     Amy Fletcher  Chart routed to Amy Fletcher CMA.

## 2022-11-09 ENCOUNTER — OFFICE VISIT (OUTPATIENT)
Dept: FAMILY MEDICINE | Facility: CLINIC | Age: 67
End: 2022-11-09
Payer: COMMERCIAL

## 2022-11-09 VITALS
SYSTOLIC BLOOD PRESSURE: 122 MMHG | OXYGEN SATURATION: 98 % | TEMPERATURE: 99.2 F | HEART RATE: 85 BPM | WEIGHT: 132.7 LBS | HEIGHT: 66 IN | RESPIRATION RATE: 14 BRPM | BODY MASS INDEX: 21.33 KG/M2 | DIASTOLIC BLOOD PRESSURE: 72 MMHG

## 2022-11-09 DIAGNOSIS — Z00.00 ENCOUNTER FOR MEDICARE ANNUAL WELLNESS EXAM: Primary | ICD-10-CM

## 2022-11-09 DIAGNOSIS — Z78.0 POST-MENOPAUSAL: ICD-10-CM

## 2022-11-09 DIAGNOSIS — G47.9 SLEEP DISORDER: ICD-10-CM

## 2022-11-09 DIAGNOSIS — Z11.59 ENCOUNTER FOR HEPATITIS C SCREENING TEST FOR LOW RISK PATIENT: ICD-10-CM

## 2022-11-09 PROCEDURE — G0439 PPPS, SUBSEQ VISIT: HCPCS | Performed by: FAMILY MEDICINE

## 2022-11-09 ASSESSMENT — ENCOUNTER SYMPTOMS
FEVER: 0
DYSURIA: 0
NAUSEA: 0
PALPITATIONS: 0
HEARTBURN: 0
SORE THROAT: 0
WEAKNESS: 0
COUGH: 0
EYE PAIN: 0
CHILLS: 0
ARTHRALGIAS: 1
ABDOMINAL PAIN: 0
HEADACHES: 0
NERVOUS/ANXIOUS: 0
CONSTIPATION: 0
BREAST MASS: 0
DIZZINESS: 0
FREQUENCY: 1
MYALGIAS: 1
HEMATOCHEZIA: 0
HEMATURIA: 0
JOINT SWELLING: 1
PARESTHESIAS: 0
SHORTNESS OF BREATH: 0
DIARRHEA: 0

## 2022-11-09 ASSESSMENT — PAIN SCALES - GENERAL: PAINLEVEL: NO PAIN (0)

## 2022-11-09 ASSESSMENT — ACTIVITIES OF DAILY LIVING (ADL): CURRENT_FUNCTION: NO ASSISTANCE NEEDED

## 2022-11-09 NOTE — PATIENT INSTRUCTIONS
Patient Education   Personalized Prevention Plan  You are due for the preventive services outlined below.  Your care team is available to assist you in scheduling these services.  If you have already completed any of these items, please share that information with your care team to update in your medical record.  Health Maintenance Due   Topic Date Due     Osteoporosis Screening  Never done     ANNUAL REVIEW OF HM ORDERS  Never done     Mammogram  Never done     COVID-19 Vaccine (1) Never done     Diptheria Tetanus Pertussis (DTAP/TDAP/TD) Vaccine (1 - Tdap) Never done     Zoster (Shingles) Vaccine (1 of 2) Never done     Pneumococcal Vaccine (1 - PCV) Never done     Flu Vaccine (1) 09/01/2022

## 2022-11-09 NOTE — PROGRESS NOTES
"SUBJECTIVE:   Pat is a 67 year old who presents for Preventive Visit.    Patient has been advised of split billing requirements and indicates understanding: Yes  Are you in the first 12 months of your Medicare coverage?  No    Healthy Habits:     In general, how would you rate your overall health?  Good    Frequency of exercise:  1 day/week    Duration of exercise:  Less than 15 minutes    Do you usually eat at least 4 servings of fruit and vegetables a day, include whole grains    & fiber and avoid regularly eating high fat or \"junk\" foods?  Yes    Taking medications regularly:  Not Applicable    Medication side effects:  Not applicable    Ability to successfully perform activities of daily living:  No assistance needed    Home Safety:  No safety concerns identified    Hearing Impairment:  Difficulty following a conversation in a noisy restaurant or crowded room and feel that people are mumbling or not speaking clearly    In the past 6 months, have you been bothered by leaking of urine?  No    In general, how would you rate your overall mental or emotional health?  Good      PHQ-2 Total Score: 0    Additional concerns today:  Yes     -She is wanting to discuss getting a possible sleep study done.    Do you feel safe in your environment? Yes    Have you ever done Advance Care Planning? (For example, a Health Directive, POLST, or a discussion with a medical provider or your loved ones about your wishes): Yes, patient states has an Advance Care Planning document and will bring a copy to the clinic.         Fall risk  Fallen 2 or more times in the past year?: No  Any fall with injury in the past year?: No    Cognitive Screening   1) Repeat 3 items (Leader, Season, Table)    2) Clock draw: NORMAL  3) 3 item recall: Recalls 2 objects   Results: NORMAL clock, 1-2 items recalled: COGNITIVE IMPAIRMENT LESS LIKELY    Mini-CogTM Copyright TREY Ellison. Licensed by the author for use in [facility]; reprinted with " permission (christina@[facility]). All rights reserved.      Do you have sleep apnea, excessive snoring or daytime drowsiness?: yes drowsiness    Reviewed and updated as needed this visit by clinical staff   Tobacco  Allergies  Meds   Med Hx  Surg Hx  Fam Hx  Soc Hx        Reviewed and updated as needed this visit by Provider                 Social History     Tobacco Use     Smoking status: Never     Smokeless tobacco: Never   Substance Use Topics     Alcohol use: Yes     Comment: rare; 2-3 drinks per month or less     If you drink alcohol do you typically have >3 drinks per day or >7 drinks per week? No    Alcohol Use 11/9/2022   Prescreen: >3 drinks/day or >7 drinks/week? No   Prescreen: >3 drinks/day or >7 drinks/week? -   No flowsheet data found.    Current providers sharing in care for this patient include:   Patient Care Team:  Jeramy Conti MD as PCP - General  Jeramy Conti MD as Assigned PCP    The following health maintenance items are reviewed in Epic and correct as of today:  Health Maintenance   Topic Date Due     DEXA  Never done     MAMMO SCREENING  Never done     COVID-19 Vaccine (1) Never done     DTAP/TDAP/TD IMMUNIZATION (1 - Tdap) Never done     ZOSTER IMMUNIZATION (1 of 2) Never done     Pneumococcal Vaccine: 65+ Years (1 - PCV) Never done     INFLUENZA VACCINE (1) 09/01/2022     MEDICARE ANNUAL WELLNESS VISIT  11/09/2023     ANNUAL REVIEW OF HM ORDERS  11/09/2023     FALL RISK ASSESSMENT  11/09/2023     LIPID  01/06/2026     COLORECTAL CANCER SCREENING  04/28/2027     ADVANCE CARE PLANNING  11/09/2027     HEPATITIS C SCREENING  Completed     PHQ-2 (once per calendar year)  Completed     IPV IMMUNIZATION  Aged Out     MENINGITIS IMMUNIZATION  Aged Out     BP Readings from Last 3 Encounters:   11/09/22 122/72   01/06/21 (!) 152/97   12/02/19 130/74    Wt Readings from Last 3 Encounters:   11/09/22 60.2 kg (132 lb 11.2 oz)   01/06/21 60.5 kg (133 lb 6.4 oz)   12/02/19 58.5 kg (129 lb)             Patient Active Problem List   Diagnosis     CARDIOVASCULAR SCREENING; LDL GOAL LESS THAN 160     Advanced directives, counseling/discussion     Health Care Home     Past Surgical History:   Procedure Laterality Date     SALPINGO OOPHORECTOMY,R/L/SHANNON      Laparoscopic LEFT salping-oophorectomy     TUBAL LIGATION      RIGHT tubal ligation       Social History     Tobacco Use     Smoking status: Never     Smokeless tobacco: Never   Substance Use Topics     Alcohol use: Yes     Comment: rare; 2-3 drinks per month or less     Family History   Problem Relation Age of Onset     Hypertension Mother              Cancer Father         prostate     Hypertension Father      Cerebrovascular Disease Father              Other Cancer Father         brain     Diabetes Maternal Grandmother         grandmother         Current Outpatient Medications   Medication Sig Dispense Refill     NO ACTIVE MEDICATIONS .       Allergies   Allergen Reactions     Nka [No Known Allergies]          Breast CA Risk Assessment (FHS-7) 2022   Do you have a family history of breast, colon, or ovarian cancer? No / Unknown         Pertinent mammograms are reviewed under the imaging tab.    Review of Systems   Constitutional: Negative for chills and fever.   HENT: Positive for hearing loss. Negative for congestion, ear pain and sore throat.    Eyes: Negative for pain and visual disturbance.   Respiratory: Negative for cough and shortness of breath.    Cardiovascular: Negative for palpitations and peripheral edema.   Gastrointestinal: Negative for abdominal pain, constipation, diarrhea, heartburn, hematochezia and nausea.   Breasts:  Negative for tenderness, breast mass and discharge.   Genitourinary: Positive for frequency and urgency. Negative for dysuria, genital sores, hematuria, pelvic pain, vaginal bleeding and vaginal discharge.   Musculoskeletal: Positive for arthralgias, joint swelling and myalgias.   Skin: Negative for  "rash.   Neurological: Negative for dizziness, weakness, headaches and paresthesias.   Psychiatric/Behavioral: Negative for mood changes. The patient is not nervous/anxious.      Constitutional, HEENT, cardiovascular, pulmonary, gi and gu systems are negative, except as otherwise noted.    OBJECTIVE:   /72   Pulse 85   Temp 99.2  F (37.3  C) (Tympanic)   Resp 14   Ht 1.683 m (5' 6.25\")   Wt 60.2 kg (132 lb 11.2 oz)   SpO2 98%   BMI 21.26 kg/m   Estimated body mass index is 21.26 kg/m  as calculated from the following:    Height as of this encounter: 1.683 m (5' 6.25\").    Weight as of this encounter: 60.2 kg (132 lb 11.2 oz).  Physical Exam  GENERAL: healthy, alert and no distress  NECK: no adenopathy, no asymmetry, masses, or scars and thyroid normal to palpation  RESP: lungs clear to auscultation - no rales, rhonchi or wheezes  CV: regular rate and rhythm, normal S1 S2, no S3 or S4, no murmur, click or rub, no peripheral edema and peripheral pulses strong  ABDOMEN: soft, nontender, no hepatosplenomegaly, no masses and bowel sounds normal  MS: no gross musculoskeletal defects noted, no edema    Diagnostic Test Results:  Labs reviewed in Epic    ASSESSMENT / PLAN:   (Z00.00) Encounter for Medicare annual wellness exam  (primary encounter diagnosis)      (Z78.0) Post-menopausal  Comment:   Plan: DEXA HIP/PELVIS/SPINE - Future              Patient has been advised of split billing requirements and indicates understanding: Yes      COUNSELING:  Reviewed preventive health counseling, as reflected in patient instructions       Regular exercise       Healthy diet/nutrition       Vision screening       Hearing screening       Dental care       Bladder control       Fall risk prevention       Osteoporosis prevention/bone health       Colon cancer screening    Estimated body mass index is 21.26 kg/m  as calculated from the following:    Height as of this encounter: 1.683 m (5' 6.25\").    Weight as of this " encounter: 60.2 kg (132 lb 11.2 oz).    Weight management plan: Discussed healthy diet and exercise guidelines    She reports that she has never smoked. She has never used smokeless tobacco.      Appropriate preventive services were discussed with this patient, including applicable screening as appropriate for cardiovascular disease, diabetes, osteopenia/osteoporosis, and glaucoma.  As appropriate for age/gender, discussed screening for colorectal cancer, prostate cancer, breast cancer, and cervical cancer. Checklist reviewing preventive services available has been given to the patient.    Reviewed patients plan of care and provided an AVS. The Intermediate Care Plan ( asthma action plan, low back pain action plan, and migraine action plan) for Malorie meets the Care Plan requirement. This Care Plan has been established and reviewed with the Patient.    Counseling Resources:  ATP IV Guidelines  Pooled Cohorts Equation Calculator  Breast Cancer Risk Calculator  Breast Cancer: Medication to Reduce Risk  FRAX Risk Assessment  ICSI Preventive Guidelines  Dietary Guidelines for Americans, 2010  USDA's MyPlate  ASA Prophylaxis  Lung CA Screening    Jeramy Conti MD  [facility]    Identified Health Risks:

## 2022-11-10 ENCOUNTER — LAB (OUTPATIENT)
Dept: LAB | Facility: CLINIC | Age: 67
End: 2022-11-10
Attending: FAMILY MEDICINE
Payer: COMMERCIAL

## 2022-11-10 DIAGNOSIS — Z00.00 ENCOUNTER FOR MEDICARE ANNUAL WELLNESS EXAM: ICD-10-CM

## 2022-11-10 DIAGNOSIS — G47.9 SLEEP DISORDER: ICD-10-CM

## 2022-11-10 DIAGNOSIS — Z11.59 ENCOUNTER FOR HEPATITIS C SCREENING TEST FOR LOW RISK PATIENT: ICD-10-CM

## 2022-11-10 LAB
ANION GAP SERPL CALCULATED.3IONS-SCNC: 9 MMOL/L (ref 7–15)
BUN SERPL-MCNC: 13.7 MG/DL (ref 8–23)
CALCIUM SERPL-MCNC: 9.7 MG/DL (ref 8.8–10.2)
CHLORIDE SERPL-SCNC: 99 MMOL/L (ref 98–107)
CHOLEST SERPL-MCNC: 186 MG/DL
CREAT SERPL-MCNC: 0.77 MG/DL (ref 0.51–0.95)
CREAT UR-MCNC: 39.9 MG/DL
DEPRECATED HCO3 PLAS-SCNC: 31 MMOL/L (ref 22–29)
GFR SERPL CREATININE-BSD FRML MDRD: 83 ML/MIN/1.73M2
GLUCOSE SERPL-MCNC: 101 MG/DL (ref 70–99)
HDLC SERPL-MCNC: 82 MG/DL
LDLC SERPL CALC-MCNC: 90 MG/DL
MAGNESIUM SERPL-MCNC: 2 MG/DL (ref 1.7–2.3)
MICROALBUMIN UR-MCNC: <12 MG/L
MICROALBUMIN/CREAT UR: NORMAL MG/G{CREAT}
NONHDLC SERPL-MCNC: 104 MG/DL
POTASSIUM SERPL-SCNC: 4.4 MMOL/L (ref 3.4–5.3)
SODIUM SERPL-SCNC: 139 MMOL/L (ref 136–145)
TRIGL SERPL-MCNC: 70 MG/DL

## 2022-11-10 PROCEDURE — 36415 COLL VENOUS BLD VENIPUNCTURE: CPT

## 2022-11-10 PROCEDURE — 82043 UR ALBUMIN QUANTITATIVE: CPT

## 2022-11-10 PROCEDURE — 86803 HEPATITIS C AB TEST: CPT

## 2022-11-10 PROCEDURE — 80061 LIPID PANEL: CPT

## 2022-11-10 PROCEDURE — 83735 ASSAY OF MAGNESIUM: CPT

## 2022-11-10 PROCEDURE — 80048 BASIC METABOLIC PNL TOTAL CA: CPT

## 2022-11-11 LAB — HCV AB SERPL QL IA: NONREACTIVE

## 2022-11-19 ENCOUNTER — HEALTH MAINTENANCE LETTER (OUTPATIENT)
Age: 67
End: 2022-11-19

## 2022-12-02 ENCOUNTER — APPOINTMENT (OUTPATIENT)
Dept: GENERAL RADIOLOGY | Facility: CLINIC | Age: 67
End: 2022-12-02
Attending: EMERGENCY MEDICINE
Payer: COMMERCIAL

## 2022-12-02 ENCOUNTER — HOSPITAL ENCOUNTER (EMERGENCY)
Facility: CLINIC | Age: 67
Discharge: HOME | End: 2022-12-02
Attending: EMERGENCY MEDICINE | Admitting: EMERGENCY MEDICINE
Payer: COMMERCIAL

## 2022-12-02 VITALS
DIASTOLIC BLOOD PRESSURE: 90 MMHG | HEART RATE: 67 BPM | SYSTOLIC BLOOD PRESSURE: 143 MMHG | HEIGHT: 66 IN | WEIGHT: 130 LBS | TEMPERATURE: 98.1 F | OXYGEN SATURATION: 97 % | BODY MASS INDEX: 20.89 KG/M2 | RESPIRATION RATE: 41 BRPM

## 2022-12-02 DIAGNOSIS — R06.00 DYSPNEA, UNSPECIFIED TYPE: ICD-10-CM

## 2022-12-02 DIAGNOSIS — R07.9 CHEST PAIN, UNSPECIFIED TYPE: ICD-10-CM

## 2022-12-02 LAB
ALBUMIN SERPL BCG-MCNC: 4.5 G/DL (ref 3.5–5.2)
ALP SERPL-CCNC: 67 U/L (ref 35–104)
ALT SERPL W P-5'-P-CCNC: 19 U/L (ref 10–35)
ANION GAP SERPL CALCULATED.3IONS-SCNC: 8 MMOL/L (ref 7–15)
AST SERPL W P-5'-P-CCNC: 28 U/L (ref 10–35)
BASOPHILS # BLD AUTO: 0 10E3/UL (ref 0–0.2)
BASOPHILS NFR BLD AUTO: 1 %
BILIRUB SERPL-MCNC: 0.4 MG/DL
BUN SERPL-MCNC: 17.6 MG/DL (ref 8–23)
CALCIUM SERPL-MCNC: 9.2 MG/DL (ref 8.8–10.2)
CHLORIDE SERPL-SCNC: 102 MMOL/L (ref 98–107)
CREAT SERPL-MCNC: 0.94 MG/DL (ref 0.51–0.95)
DEPRECATED HCO3 PLAS-SCNC: 29 MMOL/L (ref 22–29)
EOSINOPHIL # BLD AUTO: 0.2 10E3/UL (ref 0–0.7)
EOSINOPHIL NFR BLD AUTO: 2 %
ERYTHROCYTE [DISTWIDTH] IN BLOOD BY AUTOMATED COUNT: 12.5 % (ref 10–15)
GFR SERPL CREATININE-BSD FRML MDRD: 65 ML/MIN/1.73M2
GLUCOSE SERPL-MCNC: 74 MG/DL (ref 70–99)
HCT VFR BLD AUTO: 42 % (ref 35–47)
HGB BLD-MCNC: 13.8 G/DL (ref 11.7–15.7)
HOLD SPECIMEN: NORMAL
IMM GRANULOCYTES # BLD: 0 10E3/UL
IMM GRANULOCYTES NFR BLD: 0 %
LYMPHOCYTES # BLD AUTO: 1.8 10E3/UL (ref 0.8–5.3)
LYMPHOCYTES NFR BLD AUTO: 25 %
MCH RBC QN AUTO: 30.3 PG (ref 26.5–33)
MCHC RBC AUTO-ENTMCNC: 32.9 G/DL (ref 31.5–36.5)
MCV RBC AUTO: 92 FL (ref 78–100)
MONOCYTES # BLD AUTO: 0.6 10E3/UL (ref 0–1.3)
MONOCYTES NFR BLD AUTO: 8 %
NEUTROPHILS # BLD AUTO: 4.8 10E3/UL (ref 1.6–8.3)
NEUTROPHILS NFR BLD AUTO: 64 %
NRBC # BLD AUTO: 0 10E3/UL
NRBC BLD AUTO-RTO: 0 /100
NT-PROBNP SERPL-MCNC: 212 PG/ML (ref 0–900)
PLATELET # BLD AUTO: 285 10E3/UL (ref 150–450)
POTASSIUM SERPL-SCNC: 3.9 MMOL/L (ref 3.4–5.3)
PROT SERPL-MCNC: 7.2 G/DL (ref 6.4–8.3)
RBC # BLD AUTO: 4.56 10E6/UL (ref 3.8–5.2)
SODIUM SERPL-SCNC: 139 MMOL/L (ref 136–145)
TROPONIN T SERPL HS-MCNC: 11 NG/L
TROPONIN T SERPL HS-MCNC: 16 NG/L
WBC # BLD AUTO: 7.4 10E3/UL (ref 4–11)

## 2022-12-02 PROCEDURE — 83880 ASSAY OF NATRIURETIC PEPTIDE: CPT | Performed by: EMERGENCY MEDICINE

## 2022-12-02 PROCEDURE — 93005 ELECTROCARDIOGRAM TRACING: CPT | Performed by: EMERGENCY MEDICINE

## 2022-12-02 PROCEDURE — 36415 COLL VENOUS BLD VENIPUNCTURE: CPT | Performed by: EMERGENCY MEDICINE

## 2022-12-02 PROCEDURE — 93308 TTE F-UP OR LMTD: CPT | Performed by: EMERGENCY MEDICINE

## 2022-12-02 PROCEDURE — 99285 EMERGENCY DEPT VISIT HI MDM: CPT | Mod: 25 | Performed by: EMERGENCY MEDICINE

## 2022-12-02 PROCEDURE — 71046 X-RAY EXAM CHEST 2 VIEWS: CPT

## 2022-12-02 PROCEDURE — 80053 COMPREHEN METABOLIC PANEL: CPT | Performed by: EMERGENCY MEDICINE

## 2022-12-02 PROCEDURE — 84484 ASSAY OF TROPONIN QUANT: CPT | Mod: 91 | Performed by: EMERGENCY MEDICINE

## 2022-12-02 PROCEDURE — 93010 ELECTROCARDIOGRAM REPORT: CPT | Performed by: EMERGENCY MEDICINE

## 2022-12-02 PROCEDURE — 84484 ASSAY OF TROPONIN QUANT: CPT | Performed by: EMERGENCY MEDICINE

## 2022-12-02 PROCEDURE — 93308 TTE F-UP OR LMTD: CPT | Mod: 26 | Performed by: EMERGENCY MEDICINE

## 2022-12-02 PROCEDURE — 85018 HEMOGLOBIN: CPT | Performed by: EMERGENCY MEDICINE

## 2022-12-02 ASSESSMENT — ACTIVITIES OF DAILY LIVING (ADL)
ADLS_ACUITY_SCORE: 35
ADLS_ACUITY_SCORE: 35

## 2022-12-03 NOTE — DISCHARGE INSTRUCTIONS
Activity as tolerated    It is reasonable to take aspirin 81 mg daily    Follow-up primary care regarding further evaluation, possible repeat stress test versus cardiology evaluation    Return here for worsening symptoms.    There is no evidence for heart failure, liver failure, kidney failure, significant cardiac injury, pericardial effusion or any other current life-threatening cause for your symptoms

## 2022-12-03 NOTE — ED PROVIDER NOTES
History     Chief Complaint   Patient presents with     pt noted distended neck veins on L side of neck intermittent     HPI  Malorie Gill is a 67 year old female who presents with waxing waning chest discomfort and dyspnea for the past year and a half, had stress echo accomplished 2 years ago exercised 8 minutes, no evidence for ischemia.  No current medications, non-smoker, no risk for vascular disease.  Concerned because she could see muscle bulging in her left neck.  Denies recent febrile illness cough congestion sore throat asthma leg pain or swelling history of or risk factor for DVT/pulmonary embolism.    Allergies:  Allergies   Allergen Reactions     Nka [No Known Allergies]        Problem List:    Patient Active Problem List    Diagnosis Date Noted     Advanced directives, counseling/discussion 12/09/2013     Priority: Medium     Advance Care Planning:   ACP Review and Resources Provided:  Reviewed chart for advance care plan.  Malorie Gill has no plan or code status on file however states presence of ACP document. Copy requested. Added by Natalee Luna on 12/9/2013             Health Care Home 12/09/2013     Priority: Medium     EMERGENCY CARE PLAN  December 9, 2013: No current Care Coordination follow up planned. Please refer if Care Coordination services are needed.    Presenting Problem Signs and Symptoms Treatment Plan   Questions or concerns   during clinic hours   I will call my clinic directly:  [address] [address] [phone].    Questions or concerns outside clinic hours   I will call the 24 hour nurse line at   [phone] or [phone].   Need to schedule an appointment   I will call the 24 hour scheduling team at [phone] or my clinic directly at [phone].    Same day treatment     I will call my clinic first, nurse line if after hours, urgent care and express care if needed.   Clinic care coordination services (regular clinic  "hours)     I will call a clinic care coordinator directly:     Km Orr RN  Sylvia Lopez, Fri - [phone]  Wed, Thurs - [phone]    Rosie Luis E, :    [phone]    Or call my clinic at [phone] and ask to speak with care coordination.   Crisis Services: Behavioral or Mental Health  Crisis Connection 24 Hour Phone Line  [phone]    [facility] 24 Hour Crisis Services  [phone]    [facility] (Behavioral Health Providers) Network [phone]    [facility]   [phone]       Emergency treatment -- Immediately    [facility]All 911          CARDIOVASCULAR SCREENING; LDL GOAL LESS THAN 160 10/31/2010     Priority: Medium        Past Medical History:    Past Medical History:   Diagnosis Date     Adnexal mass      Other and unspecified ovarian cyst        Past Surgical History:    Past Surgical History:   Procedure Laterality Date     SALPINGO OOPHORECTOMY,R/L/SHANNON      Laparoscopic LEFT salping-oophorectomy     TUBAL LIGATION      RIGHT tubal ligation       Family History:    Family History   Problem Relation Age of Onset     Hypertension Mother              Cancer Father         prostate     Hypertension Father      Cerebrovascular Disease Father              Other Cancer Father         brain     Diabetes Maternal Grandmother         grandmother       Social History:  Marital Status:   [2]  Social History     Tobacco Use     Smoking status: Never     Smokeless tobacco: Never   Substance Use Topics     Alcohol use: Yes     Comment: rare; 2-3 drinks per month or less     Drug use: No        Medications:    NO ACTIVE MEDICATIONS          Review of Systems  All other systems reviewed and are negative.    Physical Exam   BP: (!) 179/97  Pulse: 89  Temp: 98.1  F (36.7  C)  Resp: 18  Height: 167.6 cm (5' 6\")  Weight: 59 kg (130 lb)  SpO2: 99 %      Physical Exam  Nontoxic appearing no respiratory distress alert and oriented ×3  Head atraumatic normocephalic  There " is some left external jugular venous distention  Neck supple full active painless range of motion  Lungs clear to auscultation  Heart regular no murmur  Abdomen soft nontender bowel sounds positive   Strength and sensation grossly intact throughout the extremities, gait and station normal  Speech is fluent, good eye contact, thought processes are rational  Lower extremities without swelling, redness or tenderness  Pedal pulses symmetrical and strong    ED Course        ECG: Normal rate and rhythm, axis and intervals within normal limits, no Q waves, no ectopy no acute ST or T wave changes, unchanged from previous, read by Dr. Joe Pappas           Procedures             Results for orders placed or performed during the hospital encounter of 12/02/22 (from the past 24 hour(s))   [place] Draw    Narrative    The following orders were created for panel order [place] Draw.  Procedure                               Abnormality         Status                     ---------                               -----------         ------                     Extra Blue Top Tube[[id number]]                              Final result               Extra Red Top Tube[[id number]]                               Final result               Extra Green Top (Lithium...[[id number]]                      Final result               Extra Purple Top Tube[[id number]]                            Final result                 Please view results for these tests on the individual orders.   CBC with platelets, differential    Narrative    The following orders were created for panel order CBC with platelets, differential.  Procedure                               Abnormality         Status                     ---------                               -----------         ------                     CBC with platelets and d...[[id number]]                      Final result                 Please view results for these tests on the individual orders.   Comprehensive  metabolic panel   Result Value Ref Range    Sodium 139 136 - 145 mmol/L    Potassium 3.9 3.4 - 5.3 mmol/L    Chloride 102 98 - 107 mmol/L    Carbon Dioxide (CO2) 29 22 - 29 mmol/L    Anion Gap 8 7 - 15 mmol/L    Urea Nitrogen 17.6 8.0 - 23.0 mg/dL    Creatinine 0.94 0.51 - 0.95 mg/dL    Calcium 9.2 8.8 - 10.2 mg/dL    Glucose 74 70 - 99 mg/dL    Alkaline Phosphatase 67 35 - 104 U/L    AST 28 10 - 35 U/L    ALT 19 10 - 35 U/L    Protein Total 7.2 6.4 - 8.3 g/dL    Albumin 4.5 3.5 - 5.2 g/dL    Bilirubin Total 0.4 <=1.2 mg/dL    GFR Estimate 65 >60 mL/min/1.73m2   Extra Blue Top Tube   Result Value Ref Range    Hold Specimen JIC    Extra Red Top Tube   Result Value Ref Range    Hold Specimen JIC    Extra Green Top (Lithium Heparin) Tube   Result Value Ref Range    Hold Specimen     Extra Purple Top Tube   Result Value Ref Range    Hold Specimen     CBC with platelets and differential   Result Value Ref Range    WBC Count 7.4 4.0 - 11.0 10e3/uL    RBC Count 4.56 3.80 - 5.20 10e6/uL    Hemoglobin 13.8 11.7 - 15.7 g/dL    Hematocrit 42.0 35.0 - 47.0 %    MCV 92 78 - 100 fL    MCH 30.3 26.5 - 33.0 pg    MCHC 32.9 31.5 - 36.5 g/dL    RDW 12.5 10.0 - 15.0 %    Platelet Count 285 150 - 450 10e3/uL    % Neutrophils 64 %    % Lymphocytes 25 %    % Monocytes 8 %    % Eosinophils 2 %    % Basophils 1 %    % Immature Granulocytes 0 %    NRBCs per 100 WBC 0 <1 /100    Absolute Neutrophils 4.8 1.6 - 8.3 10e3/uL    Absolute Lymphocytes 1.8 0.8 - 5.3 10e3/uL    Absolute Monocytes 0.6 0.0 - 1.3 10e3/uL    Absolute Eosinophils 0.2 0.0 - 0.7 10e3/uL    Absolute Basophils 0.0 0.0 - 0.2 10e3/uL    Absolute Immature Granulocytes 0.0 <=0.4 10e3/uL    Absolute NRBCs 0.0 10e3/uL   Troponin T, High Sensitivity   Result Value Ref Range    Troponin T, High Sensitivity 16 (H) <=14 ng/L   NT pro BNP   Result Value Ref Range    N terminal Pro BNP Inpatient 212 0 - 900 pg/mL   POC US ECHO LIMITED    Impression    [facility] Procedure  Note      Limited Bedside ED Cardiac Ultrasound:    PROCEDURE: PERFORMED BY: Dr. Joe Pappas MD, MD  INDICATIONS/SYMPTOM:  Chest Pain and Shortness of Breath  PROBE: Cardiac phased array probe  BODY LOCATION: Chest  FINDINGS:   The ultrasound was performed utilizing the subcostal, parasternal long axis and parasternal short axis views.  Cardiac contractility:  Present  Gross estimation of cardiac kinesis: normal  Pericardial Effusion:  None  RV:LV ratio: LV > RV  IVC:    Diameter:  IVC diameter expiration (IVCe) 2.3 cm                                                                                                       Collapsibility:  IVC collapses > 50% with inspiration  INTERPRETATION:    Chamber size and motion were grossly normal with LV > RV, normal cardiac kinesis.  No pericardial effusion was found.  IVC visualized and findings indicate normovolemia.  IMAGE DOCUMENTATION: Images were archived to PACs system.         XR Chest 2 Views    Narrative    EXAM: XR CHEST 2 VIEWS  LOCATION: [facility]  DATE/TIME: 12/2/2022 7:50 PM    INDICATION: dyspnea  COMPARISON: None      Impression    IMPRESSION: Ovoid 2.5 x 1.7x 2.8 cm heterogeneously calcified mass seen overlying left lung base and presumed to reside within the left breast. Patient should have mammograms for comparison (no reports are available on her Epic chart).     If she has not undergone any screening mammography, a follow-up bilateral diagnostic mammogram is suggested.    Lungs are otherwise clear. No signs of pneumonia or failure. Heart and pulmonary vascularity are normal. Mild S-shaped thoracolumbar scoliosis.    NOTE: ABNORMAL REPORT    THE DICTATION ABOVE DESCRIBES AN ABNORMALITY FOR WHICH FOLLOW-UP IS NEEDED.        Troponin T, High Sensitivity   Result Value Ref Range    Troponin T, High Sensitivity 11 <=14 ng/L       Medications - No data to display    Assessments & Plan (with Medical Decision Making)  Chest pain  shortness of air details per HPI.  Usual differential considered.  ECG without acute ischemic change.  Bedside ultrasound normal and reassuring.  Treadmill echo 2 years ago unremarkable.  Recommend aspirin 81 mg daily, follow-up primary care for further evaluation possible cardiac stress test versus referral to cardiology.  Return/recheck for chest pain worsening, progressive shortness of air passing out or any other concern.     I have reviewed the nursing notes.    I have reviewed the findings, diagnosis, plan and need for follow up with the patient.       Discharge Medication List as of 12/2/2022  9:11 PM          Final diagnoses:   Dyspnea, unspecified type   Chest pain, unspecified type       12/2/2022   [facility] EMERGENCY DEPT     Joe Pappas MD  12/02/22 [id number]

## 2023-03-17 ENCOUNTER — MYC MEDICAL ADVICE (OUTPATIENT)
Dept: FAMILY MEDICINE | Facility: CLINIC | Age: 68
End: 2023-03-17
Payer: COMMERCIAL

## 2023-03-17 DIAGNOSIS — R06.09 DOE (DYSPNEA ON EXERTION): Primary | ICD-10-CM

## 2023-03-24 ENCOUNTER — ANCILLARY PROCEDURE (OUTPATIENT)
Dept: CARDIOLOGY | Facility: CLINIC | Age: 68
End: 2023-03-24
Payer: COMMERCIAL

## 2023-03-24 DIAGNOSIS — R06.09 DOE (DYSPNEA ON EXERTION): ICD-10-CM

## 2023-03-24 LAB — LVEF ECHO: NORMAL

## 2023-03-24 PROCEDURE — 93306 TTE W/DOPPLER COMPLETE: CPT | Performed by: INTERNAL MEDICINE

## 2023-04-07 ASSESSMENT — SLEEP AND FATIGUE QUESTIONNAIRES
HOW LIKELY ARE YOU TO NOD OFF OR FALL ASLEEP IN A CAR, WHILE STOPPED FOR A FEW MINUTES IN TRAFFIC: WOULD NEVER DOZE
HOW LIKELY ARE YOU TO NOD OFF OR FALL ASLEEP WHILE LYING DOWN TO REST IN THE AFTERNOON WHEN CIRCUMSTANCES PERMIT: MODERATE CHANCE OF DOZING
HOW LIKELY ARE YOU TO NOD OFF OR FALL ASLEEP WHILE SITTING INACTIVE IN A PUBLIC PLACE: SLIGHT CHANCE OF DOZING
HOW LIKELY ARE YOU TO NOD OFF OR FALL ASLEEP WHILE WATCHING TV: SLIGHT CHANCE OF DOZING
HOW LIKELY ARE YOU TO NOD OFF OR FALL ASLEEP WHILE SITTING AND READING: SLIGHT CHANCE OF DOZING
HOW LIKELY ARE YOU TO NOD OFF OR FALL ASLEEP WHEN YOU ARE A PASSENGER IN A CAR FOR AN HOUR WITHOUT A BREAK: WOULD NEVER DOZE
HOW LIKELY ARE YOU TO NOD OFF OR FALL ASLEEP WHILE SITTING AND TALKING TO SOMEONE: WOULD NEVER DOZE
HOW LIKELY ARE YOU TO NOD OFF OR FALL ASLEEP WHILE SITTING QUIETLY AFTER LUNCH WITHOUT ALCOHOL: WOULD NEVER DOZE

## 2023-04-07 NOTE — PROGRESS NOTES
Does Malorie have a CPAP/Bipap?  No     [place] Sleep Scale: 5  BMI: 20.98  STOP BAN    Kristne is a 67 year old who is being evaluated via a billable video visit.      How would you like to obtain your AVS? MyChart  If the video visit is dropped, the invitation should be resent by: Text to cell phone: [phone]  Will anyone else be joining your video visit? No              Subjective   Kristen is a 67 year old, presenting for the following health issues:  Sleep Problem (Excessive daytime sleepiness )        Objective           Vitals:  No vitals were obtained today due to virtual visit.    Physical Exam   GENERAL: Healthy, alert and no distress  EYES: Eyes grossly normal to inspection.  No discharge or erythema, or obvious scleral/conjunctival abnormalities.  RESP: No audible wheeze, cough, or visible cyanosis.  No visible retractions or increased work of breathing.    SKIN: Visible skin clear. No significant rash, abnormal pigmentation or lesions.  NEURO: Cranial nerves grossly intact.  Mentation and speech appropriate for age.  PSYCH: Mentation appears normal, affect normal/bright, judgement and insight intact, normal speech and appearance well-groomed.            Video-Visit Details    Type of service:  Video Visit   Video Start Time: 1:02 PM  Video End Time:1:41 PM    Originating Location (pt. Location): Home    Distant Location (provider location):  On-site  Platform used for Video Visit: [facility]    Outpatient Sleep Medicine Consultation:      Name: Malorie Gill MRN# [id number]   Age: 67 year old YOB: 1955     Date of Consultation: 2023  Consultation is requested by: Jeramy Conti MD  [address] [place] [address] Jeramy Conti  Primary care provider: Jeramy Conti       Reason for Sleep Consult:     Malorie Gill is sent by Jeramy Conti for a sleep consultation regarding snoring, apneas, frequent awakenings, and hypertension. .    Patient s Reason for  visit  Malorie Gill main reason for visit: Poor sleep, high bp, severe snoring, wake up and toss, can t get back to sleep  Patient states problem(s) started: Several years ago  Malorie Gill's goals for this visit: Healthy sleep, decrease by if caused by sleep apnea, stay asleep or fall back to sleep, stop snoring and air gasping, Function improved during day, feel rested           Assessment and Plan:     Summary Sleep Diagnoses:  Suspected sleep apnea- snoring, witnessed apneas, frequent awakenings.     Comorbid Diagnoses:  High blood pressure      Summary Recommendations:  Home study for suspected sleep apnea.   No orders of the defined types were placed in this encounter.        Summary Counseling:    Sleep Testing Reviewed  Obstructive Sleep Apnea Reviewed  Complications of Untreated Sleep Apnea Reviewed      Medical Decision-making:   Educational materials provided in instructions    Total time spent reviewing medical records, history and physical examination, review of previous testing and interpretation as well as documentation on this date:45 min    CC: Jeramy Conti          History of Present Illness:     Past Sleep Evaluations:    SLEEP-WAKE SCHEDULE:     Work/School Days: Patient goes to school/work: Yes   Usually gets into bed at 1100  Takes patient about 30 to 45 minutes to fall asleep  Has trouble falling asleep 3 nights per week  Wakes up in the middle of the night 10-12 times.  Wakes up due to Snorting self awake;Pain;Use the bathroom;Anxiety  She has trouble falling back asleep Always times a week.   It usually takes 1-2 hours to get back to sleep  Patient is usually up at 7-8  Uses alarm: No    Weekends/Non-work Days/All Other Days:  Usually gets into bed at 1100   Takes patient about 30 to 45minutes to fall asleep  Patient is usually up at 7-8  Uses alarm: No    Sleep Need  Patient gets  5 hours sleep on average   Patient thinks she needs about 8 hours sleep    Malorie Gill prefers  to sleep in this position(s): Side   Patient states they do the following activities in bed: Watch TV    Naps  Patient takes a purposeful nap Rarely times a week and naps are usually 20 minutes to an hour. in duration  She feels better after a nap: Yes  She dozes off unintentionally In evening about 9:30 to 1000 3 times per week days per week  Patient has had a driving accident or near-miss due to sleepiness/drowsiness: No      SLEEP DISRUPTIONS:    Breathing/Snoring  Patient snores:Yes  Other people complain about her snoring: Yes  Patient has been told she stops breathing in her sleep:Yes  She has issues with the following: Morning headaches;Morning mouth dryness;Stuffy nose when you wake up;Heartburn or reflux at night;Getting up to urinate more than once    Movement:  Patient gets pain, discomfort, with an urge to move:  Yes  It happens when she is resting:  Yes  It happens more at night:  Yes  Patient has been told she kicks her legs at night:  No     Behaviors in Sleep:  Malorie Gill has experienced the following behaviors while sleeping: Teeth grinding  She has experienced sudden muscle weakness during the day: No      Is there anything else you would like your sleep provider to know: Concerned about elevating bp over last few months possibly related to sleep apnea.        CAFFEINE AND OTHER SUBSTANCES:    Patient consumes caffeinated beverages per day:  0  Last caffeine use is usually: 0  List of any prescribed or over the counter stimulants that patient takes: 0  List of any prescribed or over the counter sleep medication patient takes: 0  List of previous sleep medications that patient has tried: 0  Patient drinks alcohol to help them sleep: No  Patient drinks alcohol near bedtime: No    Family History:  Patient has a family member been diagnosed with a sleep disorder:              SCALES:    EPWORTH SLEEPINESS SCALE         4/7/2023     8:19 PM    [place] Sleepiness Scale ( M.W. Johns   [id number]<br>ESS - [place]/English - Final version - 21 Nov 07 - [facility] [place].)   Sitting and reading Slight chance of dozing   Watching TV Slight chance of dozing   Sitting, inactive in a public place (e.g. a theatre or a meeting) Slight chance of dozing   As a passenger in a car for an hour without a break Would never doze   Lying down to rest in the afternoon when circumstances permit Moderate chance of dozing   Sitting and talking to someone Would never doze   Sitting quietly after a lunch without alcohol Would never doze   In a car, while stopped for a few minutes in traffic Would never doze   [place] Score (MC) 5   [place] Score (Sleep) 5         INSOMNIA SEVERITY INDEX (OFELIA)          4/7/2023     7:46 PM   Insomnia Severity Index (OFELIA)   Difficulty falling asleep 1   Difficulty staying asleep 4   Problems waking up too early 4   How SATISFIED/DISSATISFIED are you with your CURRENT sleep pattern? 4   How NOTICEABLE to others do you think your sleep problem is in terms of impairing the quality of your life? 3   How WORRIED/DISTRESSED are you about your current sleep problem? 4   To what extent do you consider your sleep problem to INTERFERE with your daily functioning (e.g. daytime fatigue, mood, ability to function at work/daily chores, concentration, memory, mood, etc.) CURRENTLY? 4   OFELIA Total Score 24       Guidelines for Scoring/Interpretation:  Total score categories:  0-7 = No clinically significant insomnia   8-14 = Subthreshold insomnia   15-21 = Clinical insomnia (moderate severity)  22-28 = Clinical insomnia (severe)  Used via courtesy of www.SafeMeds Solutionsth.va.gov with permission from Jet Obrien PhD., [facility]      STOP BANG         4/7/2023     8:21 PM   STOP BANG Questionnaire (  2008, the American Society of Anesthesiologists, Inc. [employer].)   1. Snoring - Do you snore loudly (louder than talking or loud enough to be heard through closed doors)? Yes   2.  "Tired - Do you often feel tired, fatigued, or sleepy during daytime? Yes   3. Observed - Has anyone observed you stop breathing during your sleep? Yes   4. Blood pressure - Do you have or are you being treated for high blood pressure? Yes   5. BMI - BMI more than 35 kg/m2? No   6. Age - Age over 50 yr old? Yes   7. Neck circumference - Neck circumference greater than 40 cm? No   8. Gender - Gender male? No   STOP BANG Score (MC): 6 (High risk of JACQUES)         GAD7         View : No data to display.                  CAGE-AID         View : No data to display.                CAGE-AID reprinted with permission from the [place] Medical Journal, DAVIS Wiley. and SOO Swann, \"Conjoint screening questionnaires for alcohol and drug abuse\" [place] Medical Journal 94: 135-140, 1995.      PATIENT HEALTH QUESTIONNAIRE-9 (PHQ - 9)         View : No data to display.                Developed by Surinder Lyon, Marianne Byrnes, Ladarius Deng and colleagues, with an educational nicolas from [employer]. No permission required to reproduce, translate, display or distribute.        Allergies:    Allergies   Allergen Reactions     Nka [No Known Allergies]        Medications:    Current Outpatient Medications   Medication Sig Dispense Refill     NO ACTIVE MEDICATIONS .         Problem List:  Patient Active Problem List    Diagnosis Date Noted     Advanced directives, counseling/discussion 12/09/2013     Priority: Medium     Advance Care Planning:   ACP Review and Resources Provided:  Reviewed chart for advance care plan.  Malorie Gill has no plan or code status on file however states presence of ACP document. Copy requested. Added by Natalee Luna on 12/9/2013             Health Care Home 12/09/2013     Priority: Medium     EMERGENCY CARE PLAN  December 9, 2013: No current Care Coordination follow up planned. Please refer if Care Coordination services are needed.    Presenting Problem Signs and Symptoms Treatment Plan "   Questions or concerns   during clinic hours   I will call my clinic directly:  [facility]  [address] [place] [address]  [phone].    Questions or concerns outside clinic hours   I will call the 24 hour nurse line at   [phone] or [phone].   Need to schedule an appointment   I will call the 24 hour scheduling team at [phone] or my clinic directly at [phone].    Same day treatment     I will call my clinic first, nurse line if after hours, urgent care and express care if needed.   Clinic care coordination services (regular clinic hours)     I will call a clinic care coordinator directly:     Km Orr RN  Mon, Tues, Fri - [phone]  Wed, Thurs - [phone]    Rosie Kimbrough, :    [phone]    Or call my clinic at [phone] and ask to speak with care coordination.   Crisis Services: Behavioral or Mental Health  [facility] Connection 24 Hour Phone Line  [phone]    [facility] 24 Hour Crisis Services  [phone]    [facility] (Behavioral Health Providers) Network [phone]    [facility]   [phone]       Emergency treatment -- Immediately    CAll 911          CARDIOVASCULAR SCREENING; LDL GOAL LESS THAN 160 10/31/2010     Priority: Medium        Past Medical/Surgical History:  Past Medical History:   Diagnosis Date     Adnexal mass 9/97    complex LEFT adnexal mass     Other and unspecified ovarian cyst      Past Surgical History:   Procedure Laterality Date     SALPINGO OOPHORECTOMY,R/L/SHANNON  2000    Laparoscopic LEFT salping-oophorectomy     TUBAL LIGATION  2000    RIGHT tubal ligation       Social History:  Social History     Socioeconomic History     Marital status:      Spouse name: Manuel Gill     Number of children: 1     Years of education: 14     Highest education level: Not on file   Occupational History     Occupation: Perioperative Nurse     Employer: [employer]   Tobacco Use     Smoking  status: Never     Smokeless tobacco: Never   Vaping Use     Vaping status: Not on file   Substance and Sexual Activity     Alcohol use: Yes     Comment: rare; 2-3 drinks per month or less     Drug use: No     Sexual activity: Yes     Partners: Male     Birth control/protection: Post-menopausal   Other Topics Concern     Parent/sibling w/ CABG, MI or angioplasty before 65F 55M? No   Social History Narrative     Not on file     Social Determinants of Health     Financial Resource Strain: Not on file   Food Insecurity: Not on file   Transportation Needs: Not on file   Physical Activity: Not on file   Stress: Not on file   Social Connections: Not on file   Intimate Partner Violence: Not on file   Housing Stability: Not on file       Family History:  Family History   Problem Relation Age of Onset     Hypertension Mother              Cancer Father         prostate     Hypertension Father      Cerebrovascular Disease Father              Other Cancer Father         brain     Diabetes Maternal Grandmother         grandmother       Review of Systems:  A complete review of systems reviewed by me is negative with the exeption of what has been mentioned in the history of present illness.  In the last TWO WEEKS have you experienced any of the following symptoms?  Fevers: No  Night Sweats: Yes  Weight Gain: No  Pain at Night: Yes  Changes in Vision: Yes  Difficulty Breathing through Nose: Yes  Sore Throat in Morning: Yes  Dry Mouth in the Morning: Yes  Shortness of Breath Lying Flat: No  Shortness of Breath With Activity: Yes  Awakening with Shortness of Breath: Yes  Increased Cough: No  Heart Racing at Night: Yes  Swelling in Feet or Legs: No  Diarrhea at Night: No  Heartburn at Night: No  Urinating More than Once at Night: Yes  Losing Control of Urine at Night: No  Joint Pains at Night: Yes  Headaches in Morning: Yes  Depressed Mood: Yes  Anxiety: Yes     Physical Examination:  Vitals: There were no vitals taken  for this visit.  BMI= There is no height or weight on file to calculate BMI.                  Data: All pertinent previous laboratory data reviewed     Recent Labs   Lab Test 12/02/22  1818 11/10/22  1303    139   POTASSIUM 3.9 4.4   CHLORIDE 102 99   CO2 29 31*   ANIONGAP 8 9   GLC 74 101*   BUN 17.6 13.7   CR 0.94 0.77   CRIS 9.2 9.7       Recent Labs   Lab Test 12/02/22 1818   WBC 7.4   RBC 4.56   HGB 13.8   HCT 42.0   MCV 92   MCH 30.3   MCHC 32.9   RDW 12.5          Recent Labs   Lab Test 12/02/22 1818   PROTTOTAL 7.2   ALBUMIN 4.5   BILITOTAL 0.4   ALKPHOS 67   AST 28   ALT 19       TSH (mU/L)   Date Value   12/02/2019 1.23   04/13/2017 1.27       No results found for: UAMP, UBARB, BENZODIAZEUR, UCANN, UCOC, OPIT, UPCP    No results found for: IRONSAT, GF19089, WIL    No results found for: PH, PHARTERIAL, PO2, [id number], SAT, PCO2, HCO3, BASEEXCESS, DEREK, BEB    @LABRCNTIPR(phv:4,pco2v:4,po2v:4,hco3v:4,candelaria:4,o2per:4)@    Echocardiology: No results found for this or any previous visit (from the past 4320 hour(s)).    Chest x-ray: XR Chest 2 Views 12/02/2022    Narrative  EXAM: XR CHEST 2 VIEWS  LOCATION: [facility]  DATE/TIME: 12/2/2022 7:50 PM    INDICATION: dyspnea  COMPARISON: None    Impression  IMPRESSION: Ovoid 2.5 x 1.7x 2.8 cm heterogeneously calcified mass seen overlying left lung base and presumed to reside within the left breast. Patient should have mammograms for comparison (no reports are available on her Epic chart).    If she has not undergone any screening mammography, a follow-up bilateral diagnostic mammogram is suggested.    Lungs are otherwise clear. No signs of pneumonia or failure. Heart and pulmonary vascularity are normal. Mild S-shaped thoracolumbar scoliosis.    NOTE: ABNORMAL REPORT    THE DICTATION ABOVE DESCRIBES AN ABNORMALITY FOR WHICH FOLLOW-UP IS NEEDED.      Chest CT: No results found for this or any previous visit from the past 365  days.      PFT: Most Recent [facility] Pulmonary Function Testing    No results found for: 20001  No results found for: 20002  No results found for: 20003  No results found for: 20015  No results found for: 20016  No results found for: 20027  No results found for: 20028  No results found for: 20029  No results found for: 20079  No results found for: 20080  No results found for: 20081  No results found for: 20335  No results found for: 20105  No results found for: 20053  No results found for: 20054  No results found for: 20055      Manuela Houston [facility] 4/11/2023

## 2023-04-11 ENCOUNTER — VIRTUAL VISIT (OUTPATIENT)
Dept: SLEEP MEDICINE | Facility: CLINIC | Age: 68
End: 2023-04-11
Attending: FAMILY MEDICINE
Payer: COMMERCIAL

## 2023-04-11 DIAGNOSIS — R29.818 SUSPECTED SLEEP APNEA: Primary | ICD-10-CM

## 2023-04-11 DIAGNOSIS — G47.9 SLEEP DISORDER: ICD-10-CM

## 2023-04-11 PROCEDURE — 99203 OFFICE O/P NEW LOW 30 MIN: CPT | Mod: VID | Performed by: PHYSICIAN ASSISTANT

## 2023-04-11 NOTE — PATIENT INSTRUCTIONS
[facility] Dentists  Search engine: https://mms.aadsm.org/members/directory/search_bootstrap.php?org_id=ADSM&   Certified in Dental Sleep Medicine        Keith Perkins DDS, MS   [facility] Professional [facility]   [address]   [address]   [place] [address]   Appointments: [phone]   Fax: [phone]      Kenn Holland  Degree: DDS  [address]  [address]  [place] [address]  Professional Phone: [phone]  Fax: [phone]         David Chapman  Degree: MARYS  [address]  [address]  [place] [address]  Professional Phone: [phone]  Website: [email or web address]    Jason Sheffield  Degree: LAVERNE  [facility]  [address]  [address]  [place] [address]  Professional Phone: [phone]: [phone]    Vani Vallejo  [facility]  [address]  [place] [address]  Professional Phone: [phone]  Website: [email or web address]        Manuel Edmond  Degree: LAVERNE  [facility]  [address]  [address]  [place] [address]  Professional Phone: [phone]  Website: [email or web address]      Bria Gould  [facility]-you should verify insurance coverage  [address]  [address]  [place] [address]  Professional Phone: [phone]  Website: [email or web address]      Sary Reyes--DOES NOT ACCEPT INSURANCE  Degree: LAVERNE--you should verify insurance coverage  [facility]  [address]  [address]  [place] [address]  Professional Phone: [phone]     Monica Cabezas  Degree: LAVERNE, PhD  [facility]  [address] [place], [place] [address]  [facility]   [address] [place], [place] [address]Karin,   [address]   [place] [address]   Appointments: [phone]   Fax: [phone]   [facility]    [facility]   [address]   [address]   [place] [address]   Appointments: [phone]   Fax: [phone]                Maicol Carnes  Degree: LAVERNE  [address] [place] [address]  Professional Phone: [phone]  Fax: [phone]  Website: [email or web address]      Jerome Centeno  Degree: LAVERNE  [facility]  [address] [place] [address]    Mariona Mulet Pradera  Degree: MS LAVERNE  [facility] TMD, Oral Medicine, Dental Sleep [facility]  [address] [place] [address]  Professional Phone: [phone]      Malorie Alvarado  Degree: LAVERNE [facility]  [address]  [address]  [place] [address]  Professional Phone: [phone]    Apolonia Mcghee  Degree: LAVERNE  [facility]  [address]  [address]  [place] [address]  [place] Office     Phani Barrera  Degree: LAVERNE  [facility]  [address] [place] [address]  Professional Phone: [phone]  Website: [email or web address]      Praveen Graff  Degree: LAVERNE  [facility]  [address] [place] [address]  Professional Phone: [phone]  Fax: [phone]      Yrn Davis  Degree: LAVERNE [facility]  [address]  [address]  [place] [address]                 ACCEPT MEDICARE  Kevin Eric DDS  [facility], [place] [address]  [phone]; [phone] (fax)  [employer]      Seven Carnes DDS   [address]   [place] [address]  Appointments [phone]      ADDITIONAL PROVIDERS    Tom Fishman DDS   [employer]   [address]   [address]   [place] [address]   Appointments: [phone]   Fax: [phone]       Beka Deshpande DDS, [facility] Professional [address] [address]   Appointments: [phone] Ext: [phone]  Fax: [phone]   [email or web address]

## 2023-04-26 ENCOUNTER — E-VISIT (OUTPATIENT)
Dept: FAMILY MEDICINE | Facility: CLINIC | Age: 68
End: 2023-04-26
Payer: COMMERCIAL

## 2023-04-26 ENCOUNTER — NURSE TRIAGE (OUTPATIENT)
Dept: FAMILY MEDICINE | Facility: CLINIC | Age: 68
End: 2023-04-26

## 2023-04-26 DIAGNOSIS — I10 PRIMARY HYPERTENSION: Primary | ICD-10-CM

## 2023-04-26 PROCEDURE — 99207 PR NO CHARGE LOS: CPT | Performed by: FAMILY MEDICINE

## 2023-04-26 NOTE — TELEPHONE ENCOUNTER
Last 3-4 months 150/90s consistently  Today (now)  /90  HR 72.  Not having any symptoms currently.      Diastolic consistently  80-90  Systolic 150-160    No active chest pain or blurred vision, dizziness, shortness of breath, unsteady gait.      BP earlier today was 159/107 HR 68.    She is not on any blood pressure medications.        SEE IN OFFICE WITHIN 3 DAYS:   * You need to be examined.   * Let me give you an appointment.    Sent back to central scheduling to find a visit with Dr Conti within the next 3 days.      Did advise that if she starts having any symptoms we talked about with elevated BP then she needs to be seen in the emergency room.    Patient agreed with plan.    Shirin Bishop RN  [facility] ~ Registered Nurse  Clinic Triage ~ [place] [facility] [place]  April 26, 2023      Reason for Disposition    Systolic BP >= 160 OR Diastolic >= 100    Additional Information    Negative: Ran out of BP medications    Negative: Taking BP medications and feels is having side effects (e.g., impotence, cough, dizziness)    Negative: Systolic BP >= 180 OR Diastolic >= 110    Negative: Patient wants to be seen    Negative: Systolic BP >= 180 OR Diastolic >= 110, and missed most recent dose of blood pressure medication    Negative: BP Systolic BP >= 140 OR Diastolic >= 90 and postpartum (from 0 to 6 weeks after delivery)    Negative: Systolic BP >= 160 OR Diastolic >= 100, and any cardiac or neurologic symptoms (e.g., chest pain, difficulty breathing, unsteady gait, blurred vision)    Negative: Patient sounds very sick or weak to the triager    Negative: Pregnant > 20 weeks or postpartum (< 6 weeks after delivery) and new hand or face swelling    Negative: Pregnant > 20 weeks and BP > 140/90    Negative: Sounds like a life-threatening emergency to the triager    Protocols used: HIGH BLOOD PRESSURE-A-OH

## 2023-04-28 PROBLEM — I10 PRIMARY HYPERTENSION: Status: ACTIVE | Noted: 2023-04-28

## 2023-06-20 ENCOUNTER — TRANSFERRED RECORDS (OUTPATIENT)
Dept: HEALTH INFORMATION MANAGEMENT | Facility: CLINIC | Age: 68
End: 2023-06-20
Payer: COMMERCIAL

## 2023-06-24 ENCOUNTER — HOSPITAL ENCOUNTER (OUTPATIENT)
Dept: MRI IMAGING | Facility: CLINIC | Age: 68
Discharge: HOME | End: 2023-06-24
Attending: ORTHOPAEDIC SURGERY | Admitting: ORTHOPAEDIC SURGERY
Payer: COMMERCIAL

## 2023-06-24 DIAGNOSIS — M25.519 SHOULDER PAIN: ICD-10-CM

## 2023-06-24 PROCEDURE — 73221 MRI JOINT UPR EXTREM W/O DYE: CPT | Mod: RT

## 2023-06-30 ENCOUNTER — THERAPY VISIT (OUTPATIENT)
Dept: SLEEP MEDICINE | Facility: CLINIC | Age: 68
End: 2023-06-30
Attending: PHYSICIAN ASSISTANT
Payer: COMMERCIAL

## 2023-06-30 DIAGNOSIS — R29.818 SUSPECTED SLEEP APNEA: ICD-10-CM

## 2023-06-30 DIAGNOSIS — G47.9 SLEEP DISORDER: ICD-10-CM

## 2023-06-30 PROCEDURE — 95810 POLYSOM 6/> YRS 4/> PARAM: CPT | Performed by: INTERNAL MEDICINE

## 2023-07-05 ENCOUNTER — TRANSFERRED RECORDS (OUTPATIENT)
Dept: HEALTH INFORMATION MANAGEMENT | Facility: CLINIC | Age: 68
End: 2023-07-05
Payer: COMMERCIAL

## 2023-07-14 NOTE — PROGRESS NOTES
Does Malorie have a CPAP/Bipap?  No     [facility] Sleep Scale:  9  Kristen is a 68 year old who is being evaluated via a billable video visit.      How would you like to obtain your AVS? MyChart  If the video visit is dropped, the invitation should be resent by: Text to cell phone: [phone]  Will anyone else be joining your video visit? No              Subjective   Kristen is a 68 year old, presenting for the following health issues:  No chief complaint on file.          Objective           Vitals:  No vitals were obtained today due to virtual visit.    Physical Exam   GENERAL: Healthy, alert and no distress  EYES: Eyes grossly normal to inspection.  No discharge or erythema, or obvious scleral/conjunctival abnormalities.  RESP: No audible wheeze, cough, or visible cyanosis.  No visible retractions or increased work of breathing.    SKIN: Visible skin clear. No significant rash, abnormal pigmentation or lesions.  NEURO: Cranial nerves grossly intact.  Mentation and speech appropriate for age.  PSYCH: Mentation appears normal, affect normal/bright, judgement and insight intact, normal speech and appearance well-groomed.                Video-Visit Details    Type of service:  Video Visit   Video Start Time: 1030  Video End Time:11:17 AM    Originating Location (pt. Location): Home    Distant Location (provider location):  On-site  Platform used for Video Visit: [facility]   Sleep Study Follow-Up Visit:    Date on this visit: 7/17/2023    Malorie Gill comes in today for follow-up of her sleep study done on 6/30/23 at the [facility] for possible sleep apnea.    Sleep latency 31.5 minutes without Ambien.  REM achieved.   REM latency 93.5 minutes.  Sleep efficiency 79.1%. Total sleep time 391.5 minutes.    Sleep architecture:  Stage 1, 7.5% (5%), stage 2, 58.2% (45-55%), stage 3, 18.6% (15-20%), stage REM, 15.6% (20-25%).  AHI was 8.1, without significant desaturations. RDI 14.6.  REM RDI 33.4,  consistent with severe REM JACQUES.  Supine .9, consistent with severe SUPINE JACQUES.  Periodic Limb Movement Index 14.1/hour.           These findings were reviewed with patient.     Past medical/surgical history, family history, social history, medications and allergies were reviewed.      Problem List:  Patient Active Problem List    Diagnosis Date Noted     Primary hypertension 04/28/2023     Priority: Medium     Advanced directives, counseling/discussion 12/09/2013     Priority: Medium     Advance Care Planning:   ACP Review and Resources Provided:  Reviewed chart for advance care plan.  Malorie Gill has no plan or code status on file however states presence of ACP document. Copy requested. Added by Natalee Luna on 12/9/2013             Health Care Home 12/09/2013     Priority: Medium     EMERGENCY CARE PLAN  December 9, 2013: No current Care Coordination follow up planned. Please refer if Care Coordination services are needed.    Presenting Problem Signs and Symptoms Treatment Plan   Questions or concerns   during clinic hours   I will call my clinic directly:  [address] [address]  [phone].    Questions or concerns outside clinic hours   I will call the 24 hour nurse line at   [phone] or [facility].   Need to schedule an appointment   I will call the 24 hour scheduling team at [phone] or my clinic directly at [phone].    Same day treatment     I will call my clinic first, nurse line if after hours, urgent care and express care if needed.     Clinic care coordination services (regular clinic hours)     I will call a clinic care coordinator directly:     Km Orr RN  Mon, Tues, Fri - [phone]  Wed, Thurs - [phone]    Rosie Kimbrough :    [phone]    Or call my clinic at [phone] and ask to speak with care coordination.     Crisis Services: Behavioral or Mental Health  Crisis [facility] 24 Hour Phone  Line  [phone]    [facility] 24 Hour Crisis Services  [phone]    [facility] (Behavioral Health Providers) Network [phone]    [facility]   [phone]         Emergency treatment -- Immediately    CAll 911          CARDIOVASCULAR SCREENING; LDL GOAL LESS THAN 160 10/31/2010     Priority: Medium        Impression/Plan:    Mild jane with borderline periodic limb movements with sleep- discussed oral appliance vs CPAP. She would like to proceed with cpap, orderd placed for APAP 5-15 CMH2O.   She will follow up with me in about 3 month(s).     Fifteen minutes spent with patient, all of which were spent face-to-face counseling, consulting, coordinating plan of care.          CC: Jeramy Conti

## 2023-07-16 ASSESSMENT — SLEEP AND FATIGUE QUESTIONNAIRES
HOW LIKELY ARE YOU TO NOD OFF OR FALL ASLEEP WHILE SITTING AND TALKING TO SOMEONE: WOULD NEVER DOZE
HOW LIKELY ARE YOU TO NOD OFF OR FALL ASLEEP WHEN YOU ARE A PASSENGER IN A CAR FOR AN HOUR WITHOUT A BREAK: SLIGHT CHANCE OF DOZING
HOW LIKELY ARE YOU TO NOD OFF OR FALL ASLEEP WHILE SITTING AND READING: MODERATE CHANCE OF DOZING
HOW LIKELY ARE YOU TO NOD OFF OR FALL ASLEEP WHILE LYING DOWN TO REST IN THE AFTERNOON WHEN CIRCUMSTANCES PERMIT: MODERATE CHANCE OF DOZING
HOW LIKELY ARE YOU TO NOD OFF OR FALL ASLEEP WHILE WATCHING TV: MODERATE CHANCE OF DOZING
HOW LIKELY ARE YOU TO NOD OFF OR FALL ASLEEP WHILE SITTING QUIETLY AFTER LUNCH WITHOUT ALCOHOL: SLIGHT CHANCE OF DOZING
HOW LIKELY ARE YOU TO NOD OFF OR FALL ASLEEP IN A CAR, WHILE STOPPED FOR A FEW MINUTES IN TRAFFIC: WOULD NEVER DOZE
HOW LIKELY ARE YOU TO NOD OFF OR FALL ASLEEP WHILE SITTING INACTIVE IN A PUBLIC PLACE: SLIGHT CHANCE OF DOZING

## 2023-07-17 ENCOUNTER — VIRTUAL VISIT (OUTPATIENT)
Dept: SLEEP MEDICINE | Facility: CLINIC | Age: 68
End: 2023-07-17
Payer: COMMERCIAL

## 2023-07-17 DIAGNOSIS — G47.33 OSA (OBSTRUCTIVE SLEEP APNEA): Primary | ICD-10-CM

## 2023-07-17 PROCEDURE — 99213 OFFICE O/P EST LOW 20 MIN: CPT | Mod: VID | Performed by: PHYSICIAN ASSISTANT

## 2023-08-01 ENCOUNTER — DOCUMENTATION ONLY (OUTPATIENT)
Dept: SLEEP MEDICINE | Facility: CLINIC | Age: 68
End: 2023-08-01
Payer: COMMERCIAL

## 2023-08-01 DIAGNOSIS — G47.33 OBSTRUCTIVE SLEEP APNEA (ADULT) (PEDIATRIC): Primary | ICD-10-CM

## 2023-08-01 NOTE — PROGRESS NOTES
Patient was offered choice of vendor and chose [facility].  Patient Malorie Gill was set up at [place]  on August 1, 2023. Patient received a Resmed Airsense 11 Pressures were set at  5 - 15 cm H2O.   Patient s ramp is 5 cm H2O for Auto and FLEX/EPR is EPR, 2.  Patient received a Resmed Mask name: [id number]  Pillow mask size Standard, heated tubing and heated humidifier.  Patient has the following compliance requirements: using and visit requirements  Patient has a follow up on TBD with Brice Sullivan

## 2023-08-04 ENCOUNTER — DOCUMENTATION ONLY (OUTPATIENT)
Dept: SLEEP MEDICINE | Facility: CLINIC | Age: 68
End: 2023-08-04
Payer: COMMERCIAL

## 2023-08-04 NOTE — PROGRESS NOTES
3 day Sleep therapy management telephone visit    Diagnostic AHI: 8.1  PSG    Confirmed with patient at time of call- N/A Patient is still interested in STM service.       Message left for patient to return call.        Objective data     Order Settings for PAP  CPAP min     CPAP max     CPAP fixed         Device settings from machine CPAP min 5     CPAP max 15      CPAP fixed      EPR Setting     RESMED soft response       Assessment: No data reporting from [place].       Action plan: Patient to have 14 day STM visit. Patient has a follow up visit scheduled:   no    Replacement device: No  STM ordered by provider: Yes     Total time spent on accessing and  interpreting remote patient PAP therapy data  10 minutes    Total time spent counseling, coaching  and reviewing PAP therapy data with patient  1 minutes    [id number] no

## 2023-08-10 ENCOUNTER — DOCUMENTATION ONLY (OUTPATIENT)
Dept: SLEEP MEDICINE | Facility: CLINIC | Age: 68
End: 2023-08-10
Payer: COMMERCIAL

## 2023-08-16 ENCOUNTER — DOCUMENTATION ONLY (OUTPATIENT)
Dept: SLEEP MEDICINE | Facility: CLINIC | Age: 68
End: 2023-08-16
Payer: COMMERCIAL

## 2023-08-16 NOTE — PROGRESS NOTES
14  DAY STM VISIT    Diagnostic AHI: 8.1  PSG    Message left for patient to return call.    Assessment: Pt not meeting objective benchmarks for compliance       Action plan: waiting for patient to return call.  and pt to have 30 day STM visit.      Device type: Auto-CPAP    PAP settings: CPAP min 5 cm  H20       CPAP max 15 cm  H20         RESMED EPR level Setting:     RESMED Soft response setting:      Mask type:  Per patient choice    Objective measures: 14 day rolling measures         Objective measure goal  Compliance   Goal >70%  Leak   Goal < 24 lpm  AHI  Goal < 5  Usage  Goal >240        Total time spent on accessing and interpreting remote patient PAP therapy data  10 minutes    Total time spent counseling, coaching  and reviewing PAP therapy data with patient  1 minutes    [id number]  [id number]  no (3 day STM)

## 2023-08-31 ENCOUNTER — DOCUMENTATION ONLY (OUTPATIENT)
Dept: SLEEP MEDICINE | Facility: CLINIC | Age: 68
End: 2023-08-31
Payer: COMMERCIAL

## 2023-10-19 ENCOUNTER — MYC MEDICAL ADVICE (OUTPATIENT)
Dept: FAMILY MEDICINE | Facility: CLINIC | Age: 68
End: 2023-10-19
Payer: COMMERCIAL

## 2023-10-19 DIAGNOSIS — I10 PRIMARY HYPERTENSION: Primary | ICD-10-CM

## 2023-10-19 DIAGNOSIS — Z13.6 CARDIOVASCULAR SCREENING; LDL GOAL LESS THAN 160: ICD-10-CM

## 2023-11-09 ENCOUNTER — TELEPHONE (OUTPATIENT)
Dept: FAMILY MEDICINE | Facility: CLINIC | Age: 68
End: 2023-11-09
Payer: COMMERCIAL

## 2023-11-09 DIAGNOSIS — I10 PRIMARY HYPERTENSION: Primary | ICD-10-CM

## 2023-11-10 ENCOUNTER — LAB (OUTPATIENT)
Dept: LAB | Facility: CLINIC | Age: 68
End: 2023-11-10
Payer: COMMERCIAL

## 2023-11-10 DIAGNOSIS — Z13.6 CARDIOVASCULAR SCREENING; LDL GOAL LESS THAN 160: ICD-10-CM

## 2023-11-10 DIAGNOSIS — I10 PRIMARY HYPERTENSION: ICD-10-CM

## 2023-11-10 LAB
ALBUMIN SERPL BCG-MCNC: 4.6 G/DL (ref 3.5–5.2)
ALP SERPL-CCNC: 70 U/L (ref 35–104)
ALT SERPL W P-5'-P-CCNC: 21 U/L (ref 0–50)
ANION GAP SERPL CALCULATED.3IONS-SCNC: 7 MMOL/L (ref 7–15)
AST SERPL W P-5'-P-CCNC: 29 U/L (ref 0–45)
BILIRUB SERPL-MCNC: 0.5 MG/DL
BUN SERPL-MCNC: 17.4 MG/DL (ref 8–23)
CALCIUM SERPL-MCNC: 9.5 MG/DL (ref 8.8–10.2)
CHLORIDE SERPL-SCNC: 102 MMOL/L (ref 98–107)
CHOLEST SERPL-MCNC: 176 MG/DL
CREAT SERPL-MCNC: 0.94 MG/DL (ref 0.51–0.95)
CREAT UR-MCNC: 139.1 MG/DL
DEPRECATED HCO3 PLAS-SCNC: 32 MMOL/L (ref 22–29)
EGFRCR SERPLBLD CKD-EPI 2021: 65 ML/MIN/1.73M2
GLUCOSE SERPL-MCNC: 131 MG/DL (ref 70–99)
HDLC SERPL-MCNC: 87 MG/DL
LDLC SERPL CALC-MCNC: 75 MG/DL
MAGNESIUM SERPL-MCNC: 2.1 MG/DL (ref 1.7–2.3)
MICROALBUMIN UR-MCNC: 40 MG/L
MICROALBUMIN/CREAT UR: 28.76 MG/G CR (ref 0–25)
NONHDLC SERPL-MCNC: 89 MG/DL
POTASSIUM SERPL-SCNC: 4.5 MMOL/L (ref 3.4–5.3)
PROT SERPL-MCNC: 7.4 G/DL (ref 6.4–8.3)
SODIUM SERPL-SCNC: 141 MMOL/L (ref 135–145)
TRIGL SERPL-MCNC: 70 MG/DL

## 2023-11-10 PROCEDURE — 82043 UR ALBUMIN QUANTITATIVE: CPT

## 2023-11-10 PROCEDURE — 80061 LIPID PANEL: CPT

## 2023-11-10 PROCEDURE — 82570 ASSAY OF URINE CREATININE: CPT

## 2023-11-10 PROCEDURE — 80053 COMPREHEN METABOLIC PANEL: CPT

## 2023-11-10 PROCEDURE — 36415 COLL VENOUS BLD VENIPUNCTURE: CPT

## 2023-11-10 PROCEDURE — 83735 ASSAY OF MAGNESIUM: CPT

## 2023-11-13 ENCOUNTER — OFFICE VISIT (OUTPATIENT)
Dept: FAMILY MEDICINE | Facility: CLINIC | Age: 68
End: 2023-11-13
Payer: COMMERCIAL

## 2023-11-13 VITALS
BODY MASS INDEX: 21.97 KG/M2 | HEART RATE: 92 BPM | SYSTOLIC BLOOD PRESSURE: 118 MMHG | OXYGEN SATURATION: 98 % | WEIGHT: 136.7 LBS | RESPIRATION RATE: 14 BRPM | TEMPERATURE: 97.6 F | HEIGHT: 66 IN | DIASTOLIC BLOOD PRESSURE: 78 MMHG

## 2023-11-13 DIAGNOSIS — I10 PRIMARY HYPERTENSION: ICD-10-CM

## 2023-11-13 DIAGNOSIS — Z12.31 VISIT FOR SCREENING MAMMOGRAM: ICD-10-CM

## 2023-11-13 DIAGNOSIS — R35.0 URINARY FREQUENCY: ICD-10-CM

## 2023-11-13 DIAGNOSIS — Z00.00 ENCOUNTER FOR MEDICARE ANNUAL WELLNESS EXAM: Primary | ICD-10-CM

## 2023-11-13 LAB
ALBUMIN UR-MCNC: NEGATIVE MG/DL
APPEARANCE UR: CLEAR
BACTERIA #/AREA URNS HPF: ABNORMAL /HPF
BILIRUB UR QL STRIP: NEGATIVE
COLOR UR AUTO: YELLOW
GLUCOSE UR STRIP-MCNC: NEGATIVE MG/DL
HGB UR QL STRIP: ABNORMAL
KETONES UR STRIP-MCNC: NEGATIVE MG/DL
LEUKOCYTE ESTERASE UR QL STRIP: NEGATIVE
MUCOUS THREADS #/AREA URNS LPF: PRESENT /LPF
NITRATE UR QL: NEGATIVE
PH UR STRIP: 6 [PH] (ref 5–7)
RBC #/AREA URNS AUTO: ABNORMAL /HPF
SP GR UR STRIP: 1.01 (ref 1–1.03)
SQUAMOUS #/AREA URNS AUTO: ABNORMAL /LPF
UROBILINOGEN UR STRIP-ACNC: 0.2 E.U./DL
WBC #/AREA URNS AUTO: ABNORMAL /HPF

## 2023-11-13 PROCEDURE — 81001 URINALYSIS AUTO W/SCOPE: CPT | Performed by: FAMILY MEDICINE

## 2023-11-13 PROCEDURE — 99214 OFFICE O/P EST MOD 30 MIN: CPT | Mod: 25 | Performed by: FAMILY MEDICINE

## 2023-11-13 PROCEDURE — G0439 PPPS, SUBSEQ VISIT: HCPCS | Performed by: FAMILY MEDICINE

## 2023-11-13 ASSESSMENT — ENCOUNTER SYMPTOMS
DIARRHEA: 0
FREQUENCY: 1
ARTHRALGIAS: 1
ABDOMINAL PAIN: 0
HEMATURIA: 0
HEARTBURN: 0
MYALGIAS: 1
NAUSEA: 0
FEVER: 0
BREAST MASS: 0
DYSURIA: 0
CHILLS: 0
EYE PAIN: 0
DIZZINESS: 0
COUGH: 0
SORE THROAT: 0
CONSTIPATION: 0
WEAKNESS: 1
HEMATOCHEZIA: 0
SHORTNESS OF BREATH: 1
PARESTHESIAS: 0
PALPITATIONS: 0

## 2023-11-13 ASSESSMENT — PAIN SCALES - GENERAL: PAINLEVEL: NO PAIN (0)

## 2023-11-13 ASSESSMENT — ACTIVITIES OF DAILY LIVING (ADL): CURRENT_FUNCTION: NO ASSISTANCE NEEDED

## 2023-11-13 NOTE — PATIENT INSTRUCTIONS
Patient Education   Personalized Prevention Plan  You are due for the preventive services outlined below.  Your care team is available to assist you in scheduling these services.  If you have already completed any of these items, please share that information with your care team to update in your medical record.  Health Maintenance Due   Topic Date Due     Osteoporosis Screening  Never done     Mammogram  Never done     COVID-19 Vaccine (1) Never done     Diptheria Tetanus Pertussis (DTAP/TDAP/TD) Vaccine (1 - Tdap) Never done     Zoster (Shingles) Vaccine (1 of 2) Never done     RSV VACCINE (Pregnancy & 60+) (1 - 1-dose 60+ series) Never done     Pneumococcal Vaccine (1 - PCV) Never done     Flu Vaccine (1) 09/01/2023

## 2023-11-13 NOTE — PROGRESS NOTES
"SUBJECTIVE:   Kristen is a 68 year old who presents for Preventive Visit.      11/13/2023    10:12 AM   Additional Questions   Roomed by Amy Fletcher CMA   Accompanied by        Are you in the first 12 months of your Medicare coverage?  No    Healthy Habits:     In general, how would you rate your overall health?  Good    Frequency of exercise:  1 day/week    Duration of exercise:  Less than 15 minutes    Do you usually eat at least 4 servings of fruit and vegetables a day, include whole grains    & fiber and avoid regularly eating high fat or \"junk\" foods?  Yes    Taking medications regularly:  Not Applicable    Medication side effects:  Not applicable    Ability to successfully perform activities of daily living:  No assistance needed    Home Safety:  No safety concerns identified    Hearing Impairment:  Difficulty following a conversation in a noisy restaurant or crowded room, need to ask people to speak up or repeat themselves and difficulty understanding soft or whispered speech    In the past 6 months, have you been bothered by leaking of urine?  No    In general, how would you rate your overall mental or emotional health?  Fair    Additional concerns today:  No      Today's PHQ-2 Score:       11/13/2023    10:14 AM   PHQ-2 ( 1999 [employer])   Q1: Little interest or pleasure in doing things 1   Q2: Feeling down, depressed or hopeless 1   PHQ-2 Score 2   Q1: Little interest or pleasure in doing things Several days   Q2: Feeling down, depressed or hopeless Several days   PHQ-2 Score 2     Have you ever done Advance Care Planning? (For example, a Health Directive, POLST, or a discussion with a medical provider or your loved ones about your wishes): Yes, patient states has an Advance Care Planning document and will bring a copy to the clinic.         Fall risk  Fallen 2 or more times in the past year?: No  Any fall with injury in the past year?: No    Cognitive Screening   1) Repeat 3 items (Leader, Season, Table)  "   2) Clock draw: NORMAL  3) 3 item recall: Recalls 3 objects  Results: 3 items recalled: COGNITIVE IMPAIRMENT LESS LIKELY, normal clock    Mini-CogTM Copyright TREY Ellison. Licensed by the author for use in [facility]; reprinted with permission (christina@[facility]). All rights reserved.      Do you have sleep apnea, excessive snoring or daytime drowsiness? : yes, snoring    Reviewed and updated as needed this visit by clinical staff   Tobacco  Allergies  Meds   Med Hx  Surg Hx  Fam Hx  Soc Hx        Reviewed and updated as needed this visit by Provider                 Social History     Tobacco Use    Smoking status: Never    Smokeless tobacco: Never   Substance Use Topics    Alcohol use: Yes     Comment: rare; 2-3 drinks per month or less             11/13/2023    10:13 AM   Alcohol Use   Prescreen: >3 drinks/day or >7 drinks/week? No          No data to display              Do you have a current opioid prescription? No  Do you use any other controlled substances or medications that are not prescribed by a provider? None              Current providers sharing in care for this patient include:   Patient Care Team:  Jeramy Conti MD as PCP - General  Jeramy Conti MD as Assigned PCP  Brice Gibson PA-C as Assigned Sleep Provider    The following health maintenance items are reviewed in Epic and correct as of today:  Health Maintenance   Topic Date Due    DEXA  Never done    MAMMO SCREENING  Never done    COVID-19 Vaccine (1) Never done    DTAP/TDAP/TD IMMUNIZATION (1 - Tdap) Never done    ZOSTER IMMUNIZATION (1 of 2) Never done    RSV VACCINE (Pregnancy & 60+) (1 - 1-dose 60+ series) Never done    Pneumococcal Vaccine: 65+ Years (1 - PCV) Never done    INFLUENZA VACCINE (1) 09/01/2023    MEDICARE ANNUAL WELLNESS VISIT  11/13/2024    ANNUAL REVIEW OF HM ORDERS  11/13/2024    FALL RISK ASSESSMENT  11/13/2024    COLORECTAL CANCER SCREENING  04/28/2027    LIPID  11/10/2028    ADVANCE CARE  "PLANNING  11/13/2028    HEPATITIS C SCREENING  Completed    PHQ-2 (once per calendar year)  Completed    IPV IMMUNIZATION  Aged Out    HPV IMMUNIZATION  Aged Out    MENINGITIS IMMUNIZATION  Aged Out    RSV MONOCLONAL ANTIBODY  Aged Out     Review of Systems   Constitutional:  Negative for chills and fever.   HENT:  Negative for congestion, ear pain and sore throat.    Eyes:  Positive for visual disturbance. Negative for pain.   Respiratory:  Positive for shortness of breath. Negative for cough.    Cardiovascular:  Negative for palpitations and peripheral edema.   Gastrointestinal:  Negative for abdominal pain, constipation, diarrhea, heartburn, hematochezia and nausea.   Breasts:  Negative for tenderness, breast mass and discharge.   Genitourinary:  Positive for frequency and urgency. Negative for dysuria, genital sores, hematuria, pelvic pain, vaginal bleeding and vaginal discharge.   Musculoskeletal:  Positive for arthralgias and myalgias.   Skin:  Negative for rash.   Neurological:  Positive for weakness. Negative for dizziness and paresthesias.     Constitutional, HEENT, cardiovascular, pulmonary, gi and gu systems are negative, except as otherwise noted.    OBJECTIVE:   /78   Pulse 92   Temp 97.6  F (36.4  C) (Tympanic)   Resp 14   Ht 1.676 m (5' 6\")   Wt 62 kg (136 lb 11.2 oz)   SpO2 98%   BMI 22.06 kg/m   Estimated body mass index is 22.06 kg/m  as calculated from the following:    Height as of this encounter: 1.676 m (5' 6\").    Weight as of this encounter: 62 kg (136 lb 11.2 oz).  Physical Exam  GENERAL: healthy, alert and no distress  NECK: no adenopathy, no asymmetry, masses, or scars and thyroid normal to palpation  RESP: lungs clear to auscultation - no rales, rhonchi or wheezes  CV: regular rate and rhythm, normal S1 S2, no S3 or S4, no murmur, click or rub, no peripheral edema and peripheral pulses strong  ABDOMEN: soft, nontender, no hepatosplenomegaly, no masses and bowel sounds " normal  MS: no gross musculoskeletal defects noted, no edema    Diagnostic Test Results:  Labs reviewed in Epic    ASSESSMENT / PLAN:   (Z00.00) Encounter for Medicare annual wellness exam  (primary encounter diagnosis)      (R35.0) Urinary frequency  Plan: UA Macroscopic with reflex to Microscopic and         Culture - Lab Collect, UA Microscopic with         Reflex to Culture      (I10) Primary hypertension  Comment: Good control.  Continue currant medications, continue to monitor.   Dasg diet controlled  Plan: Albumin Random Urine Quantitative with Creat         Ratio            Patient has been advised of split billing requirements and indicates understanding: Yes      COUNSELING:  Reviewed preventive health counseling, as reflected in patient instructions       Regular exercise       Healthy diet/nutrition       Vision screening       Hearing screening       Dental care       Bladder control       Fall risk prevention       Colon cancer screening        She reports that she has never smoked. She has never used smokeless tobacco.      Appropriate preventive services were discussed with this patient, including applicable screening as appropriate for fall prevention, nutrition, physical activity, Tobacco-use cessation, weight loss and cognition.  Checklist reviewing preventive services available has been given to the patient.    Reviewed patients plan of care and provided an AVS. The Intermediate Care Plan ( asthma action plan, low back pain action plan, and migraine action plan) for Malorie meets the Care Plan requirement. This Care Plan has been established and reviewed with the Patient.        Jeramy Conti MD  [facility]    Identified Health Risks:  I have reviewed Opioid Use Disorder and Substance Use Disorder risk factors and made any needed referrals.

## 2023-11-16 ENCOUNTER — MYC MEDICAL ADVICE (OUTPATIENT)
Dept: FAMILY MEDICINE | Facility: CLINIC | Age: 68
End: 2023-11-16
Payer: COMMERCIAL

## 2023-11-18 ENCOUNTER — HEALTH MAINTENANCE LETTER (OUTPATIENT)
Age: 68
End: 2023-11-18

## 2024-03-06 ENCOUNTER — OFFICE VISIT (OUTPATIENT)
Dept: FAMILY MEDICINE | Facility: CLINIC | Age: 69
End: 2024-03-06
Payer: COMMERCIAL

## 2024-03-06 ENCOUNTER — ANCILLARY PROCEDURE (OUTPATIENT)
Dept: GENERAL RADIOLOGY | Facility: CLINIC | Age: 69
End: 2024-03-06
Attending: PHYSICIAN ASSISTANT
Payer: COMMERCIAL

## 2024-03-06 VITALS
WEIGHT: 141.4 LBS | TEMPERATURE: 98.5 F | BODY MASS INDEX: 22.73 KG/M2 | DIASTOLIC BLOOD PRESSURE: 84 MMHG | HEART RATE: 82 BPM | RESPIRATION RATE: 16 BRPM | OXYGEN SATURATION: 98 % | HEIGHT: 66 IN | SYSTOLIC BLOOD PRESSURE: 120 MMHG

## 2024-03-06 DIAGNOSIS — M72.2 PLANTAR FASCIITIS: Primary | ICD-10-CM

## 2024-03-06 DIAGNOSIS — M72.2 PLANTAR FASCIITIS: ICD-10-CM

## 2024-03-06 DIAGNOSIS — G89.29 HEEL PAIN, CHRONIC, RIGHT: ICD-10-CM

## 2024-03-06 DIAGNOSIS — M79.671 HEEL PAIN, CHRONIC, RIGHT: ICD-10-CM

## 2024-03-06 PROCEDURE — 73630 X-RAY EXAM OF FOOT: CPT | Mod: TC | Performed by: RADIOLOGY

## 2024-03-06 PROCEDURE — 99213 OFFICE O/P EST LOW 20 MIN: CPT | Performed by: PHYSICIAN ASSISTANT

## 2024-03-06 RX ORDER — RESPIRATORY SYNCYTIAL VIRUS VACCINE 120MCG/0.5
0.5 KIT INTRAMUSCULAR ONCE
Qty: 1 EACH | Refills: 0 | Status: CANCELLED | OUTPATIENT
Start: 2024-03-06 | End: 2024-03-06

## 2024-03-06 NOTE — PATIENT INSTRUCTIONS
Can use NSAID's and application of cold packs as needed to treat or prevent pain.  Try to reduce walking on hard surfaces. Wear shoes with arch support in the house   some arch supports such as Spenco or super feet  Roll your foot over a tennis ball or frozen water bottle  ABC foot exercises every morning before you get out of bed  Each day spend time picking up a washcloth from the floor with your toes  Once or twice daily put your foot on your bed, reach down to pull your toes back - stretch hamstrings  Can try night splint or [place] sock  If not greatly improved can see podiatry for possible orthotics or steroid injections.    calf stretches to perform every hour daily until symptoms resolve.  After symptoms resolve, the patient was advised to perform the stretches 3 times daily to prevent future recurrence.  The patient was instructed to perform warm soaks with Epson salt after which to also apply over-the-counter Voltaren gel to deeply massage the injured tissue.  The patient was instructed to do this on a daily basis until symptoms resolve.     Next Steps:        Support:  Wear supportive shoes, sandals, boots and/or inserts that have a rigid supportive sole.    This will offload the majority of tension forces that travel through your feet every step you take.    Voice123 Max Cushioning Elite/Premier   Voice123 Relax Fit D'Lux Walker  Reebok Walk Ultra 7 DMX MAX   Hoka Bondi walking shoes  NovoEDeet inserts ([email or web address].com)  It is important that you also wear supportive shoe wear in the house to continue providing support to your feet.    You may always use a cushioned liner for your shoes if that makes your feet feel better.  Stretching  Calf stretching is essential to offload the tension forces that travel through your feet every step you take  Preferred calf stretch is the Runner's Stretch  Place one foot behind the other foot, flat against the ground (it is important to keep the heel on the  ground).  The back leg is the one that will be stretched.  Start with the knee straight and lean your hips into the wall, counter or whatever you are leaning into - count to ten.  Next, bend the knee.  You should feel the stretch lower in the calf muscle - count to ten.  Repeat this stretch once an hour to start off with.  When symptoms subside, I recommend performing the stretch 3 times daily to prevent any future problems.                                               Tissue Massage  It is important that you physically loosen the inflammation tissue to help your body heal the injured tissue.  I recommend soaking your foot in warm water to increase the microcirculation to the soft tissues.  You may add Epson salt to the water if you prefer.  You may apply an over-the-counter muscle rub, such as Voltaren gel, and deeply massage the injured tissue.  Reduce Inflammation  You can ice the injured tissue with an ice pack with a light cloth covering or soaking in ice water 20 minutes to reduce any acute inflammation, typically at the end of the day.        It is important to understand that most problems that develop in the foot and ankle are caused by excessive tension that cause microinjury to the soft tissues and inflammation in the foot and ankle.  By addressing the underlying causes with support and stretching as well as treating the current inflammatory conditions with tissue massage and anti-inflammatory treatments, most foot and ankle musculoskeletal conditions will resolve.  This may take time to heal.  However, if symptoms persist past 4 weeks you should return to the office for reevaluation to determine further treatment options.

## 2024-03-06 NOTE — PROGRESS NOTES
Assessment & Plan     ICD-10-CM    1. Plantar fasciitis  M72.2 XR Foot Right G/E 3 Views     diclofenac (VOLTAREN) 1 % topical gel      2. Heel pain, chronic, right  M79.671 XR Foot Right G/E 3 Views    G89.29 diclofenac (VOLTAREN) 1 % topical gel        Discussed stretches, symptomatic measures. Has used ibuprofen a couple days. Will trial massage of diclofenac gel.   I gave patient a boot to wear when she is working for the next couple weeks. Still will do frequent stretching.  She is not interested in injections. Will consider podiatry follow up.    Subjective   Pat is a 68 year old, presenting for the following health issues:  Foot Pain        3/6/2024     8:54 AM   Additional Questions   Roomed by Tonya   Accompanied by Self     History of Present Illness       Reason for visit:  Plantar fascitis and possible heel spur developed  Symptom onset:  More than a month  Symptoms include:  Burning pain in heel constant snd nt improving  Symptom progression:  Worsening  Had these symptoms before:  Yes  Has tried/received treatment for these symptoms:  Yes  Previous treatment was successful:  No  What makes it worse:  Walking  What makes it better:  Rest    She eats 2-3 servings of fruits and vegetables daily.She consumes 0 sweetened beverage(s) daily.She exercises with enough effort to increase her heart rate 10 to 19 minutes per day.  She exercises with enough effort to increase her heart rate 3 or less days per week.   She is taking medications regularly.     Had issues 10 years ago.   She used night spint which didn't help  She used to have a bone spur but cracked it with massage with a ball, and it resolved.  She would like to have another x-ray to see if the bone spur is there.    More recently Had knee pain, this resolved with chiropractor. Did get orthotics from chiropractor recently  Was walking funny and caused plantar fasciitis  Feels like a Burning spike through heel  Worse first thing in the morning,  "worse also at the end of the day as she is on her feet all day at work  Has achilles tenderness as well            Other than noted above, general, HEENT, respiratory, cardiac, MS, and gastrointestinal systems are negative.       Objective    BP (!) 146/77   Pulse 82   Temp 98.5  F (36.9  C) (Tympanic)   Resp 16   Ht 1.676 m (5' 5.98\")   Wt 64.1 kg (141 lb 6.4 oz)   SpO2 98%   BMI 22.83 kg/m    Body mass index is 22.83 kg/m .  Physical Exam   GENERAL: alert and no distress  MS: no gross musculoskeletal defects noted, no edema  ORTHO: Foot Exam: Inspection:  no swelling  Tender::plantar fascia, particularly at plantar heel  Non-tender:no bony tenderness. No tenderness at achilles insertion although she notes pain there  Range of Motion:normal    XR Foot Right G/E 3 Views    Result Date: 3/6/2024  XR FOOT RIGHT G/E 3 VIEWS 3/6/2024 9:38 AM HISTORY: hx plantar fasciitis and heel spur. right heel pain; Plantar fasciitis; Heel pain, chronic, right; Heel pain, chronic, right COMPARISON: None.     IMPRESSION: Mild degenerative change first MTP joint and IP joint of the great toe. Plantar calcaneal spurring. No evidence for acute fracture. ANTWON LANDEROS MD   SYSTEM ID:  [id number]         Signed Electronically by: Lydia Zheng PA-C    "

## 2024-04-18 ENCOUNTER — TELEPHONE (OUTPATIENT)
Dept: FAMILY MEDICINE | Facility: CLINIC | Age: 69
End: 2024-04-18
Payer: COMMERCIAL

## 2024-04-18 ENCOUNTER — ANCILLARY PROCEDURE (OUTPATIENT)
Dept: GENERAL RADIOLOGY | Facility: CLINIC | Age: 69
End: 2024-04-18
Attending: PHYSICIAN ASSISTANT
Payer: COMMERCIAL

## 2024-04-18 DIAGNOSIS — M79.671 HEEL PAIN, CHRONIC, RIGHT: ICD-10-CM

## 2024-04-18 DIAGNOSIS — G89.29 HEEL PAIN, CHRONIC, RIGHT: Primary | ICD-10-CM

## 2024-04-18 DIAGNOSIS — G89.29 HEEL PAIN, CHRONIC, RIGHT: ICD-10-CM

## 2024-04-18 DIAGNOSIS — M79.671 HEEL PAIN, CHRONIC, RIGHT: Primary | ICD-10-CM

## 2024-04-18 PROCEDURE — 73630 X-RAY EXAM OF FOOT: CPT | Mod: TC | Performed by: RADIOLOGY

## 2024-04-18 NOTE — TELEPHONE ENCOUNTER
Symptoms    Describe your symptoms: Pt was seen by Lydia Zheng 3/6 for a foot problem. (Dr Conti was not available) She says the Plantar Fascitis seems to be better but she suspects she could have a small fracture. She says she had this many years ago and this feels the same. She says her R foot swells. She says since her last visit she did trip to this may have caused this too. She wondered if she could just get an xray or whatever scan she would need to look at this or if she needs to be seen. She would like this done ASAP.     Any pain: Yes:     Have you been seen for this:  Yes:  3/6/24    Could we send this information to you in [employer] or would you prefer to receive a phone call?:   Patient would prefer a phone call   Okay to leave a detailed message?: Yes at Cell number on file:    Telephone Information:   Mobile [phone]

## 2024-04-18 NOTE — TELEPHONE ENCOUNTER
Noted. RN called and spoke with patient. Pain in the bottom of the foot is better. This new pain is near the the base of her toes on top of foot and over the arch on top of her foot, pain also goes into the outer aspect of the foot and into the ankle.    Pain is sometimes sharp and other times it feels muscular. OTC meds only help in the short term.   Feels better in the morning because she keeps it elevated at night.    Routing to provider. RN gave patient the number to schedule X-ray.    Nicole Mireles RN on 4/18/2024 at 2:27 PM

## 2024-04-18 NOTE — TELEPHONE ENCOUNTER
Where does her foot hurt now? Would write this in for the radiologist.  We can get an x-ray but she should schedule follow up with podiatry,, referral pended.   Unique Zheng PA-C

## 2024-04-18 NOTE — TELEPHONE ENCOUNTER
Noted. Patient had a visit on 3/6/2024 which included a X-ray. No acute fracture was seen.  Provider since this is a new injury, do you want to evaluate her again?     Nicole Mireles RN on 4/18/2024 at 1:32 PM

## 2024-04-22 ENCOUNTER — OFFICE VISIT (OUTPATIENT)
Dept: PODIATRY | Facility: CLINIC | Age: 69
End: 2024-04-22
Attending: PHYSICIAN ASSISTANT
Payer: COMMERCIAL

## 2024-04-22 VITALS
HEIGHT: 66 IN | DIASTOLIC BLOOD PRESSURE: 84 MMHG | WEIGHT: 141 LBS | HEART RATE: 81 BPM | SYSTOLIC BLOOD PRESSURE: 124 MMHG | BODY MASS INDEX: 22.66 KG/M2

## 2024-04-22 DIAGNOSIS — G89.29 HEEL PAIN, CHRONIC, RIGHT: ICD-10-CM

## 2024-04-22 DIAGNOSIS — M77.8 EXTENSOR TENDONITIS OF FOOT: ICD-10-CM

## 2024-04-22 DIAGNOSIS — M79.671 HEEL PAIN, CHRONIC, RIGHT: ICD-10-CM

## 2024-04-22 DIAGNOSIS — M76.71 PERONEAL TENDINITIS OF RIGHT LOWER LEG: ICD-10-CM

## 2024-04-22 DIAGNOSIS — M72.2 PLANTAR FASCIITIS, RIGHT: Primary | ICD-10-CM

## 2024-04-22 PROCEDURE — 99203 OFFICE O/P NEW LOW 30 MIN: CPT | Performed by: PODIATRIST

## 2024-04-22 RX ORDER — MELOXICAM 7.5 MG/1
7.5 TABLET ORAL DAILY
Qty: 30 TABLET | Refills: 1 | Status: SHIPPED | OUTPATIENT
Start: 2024-04-22 | End: 2024-07-12

## 2024-04-22 ASSESSMENT — PAIN SCALES - GENERAL: PAINLEVEL: EXTREME PAIN (8)

## 2024-04-22 NOTE — NURSING NOTE
"Chief Complaint   Patient presents with    Consult     Right foot- started as plantarfascitis- pain on the top of the foot       Initial /84   Pulse 81   Ht 1.676 m (5' 5.98\")   Wt 64 kg (141 lb)   BMI 22.77 kg/m   Estimated body mass index is 22.77 kg/m  as calculated from the following:    Height as of this encounter: 1.676 m (5' 5.98\").    Weight as of this encounter: 64 kg (141 lb).  Medications and allergies reviewed.      Rosa LAWSON MA    "

## 2024-04-22 NOTE — LETTER
"    4/22/2024         RE: Malorie Gill  [address]  [facility] [address]        Dear Colleague,    Thank you for referring your patient, Malorie Gill, to the [facility] [place]. Please see a copy of my visit note below.    PATIENT HISTORY:  Malorie Gill is a 69 year old female who presents to clinic in consultation at the request of  Lydia Zheng PA-C with a chief complaint of right foot pain.  The patient is seen by themselves.  The patient relates the pain is primarily located around the top of the foot.  Reports insidious onset without acute precipitating event. that has been going on for 3 month(s).  The patient has previously tried chiropractic, stretches, Voltaren gel, red light therapy, foot soaks with little relief.   Stepped up and whacked foot on a timber .  The patient is currently employed as RN .  Any previous notes and studies that pertain to the patient's condition were reviewed.    Pertinent medical, surgical and family history was reviewed in the [facility] chart.    Past Medical History:   Past Medical History:   Diagnosis Date     Adnexal mass 9/97    complex LEFT adnexal mass     Other and unspecified ovarian cyst        Medications:   Current Outpatient Medications:      diclofenac (VOLTAREN) 1 % topical gel, Apply 2 g topically 4 times daily, Disp: 350 g, Rfl: 1     meloxicam (MOBIC) 7.5 MG tablet, Take 1 tablet (7.5 mg) by mouth daily, Disp: 30 tablet, Rfl: 1     Allergies:    Allergies   Allergen Reactions     Nka [No Known Allergies]        Vitals: /84   Pulse 81   Ht 1.676 m (5' 5.98\")   Wt 64 kg (141 lb)   BMI 22.77 kg/m    BMI= Body mass index is 22.77 kg/m .    LOWER EXTREMITY PHYSICAL EXAM    Dermatologic: Skin is intact to right lower extremity without significant lesions, rash or abrasion.        Vascular: DP & PT pulses are intact & regular on the right.   CFT and skin temperature is normal to the right lower extremity.     Neurologic: " Lower extremity sensation is intact to light touch.  No evidence of weakness in the right lower extremity.        Musculoskeletal: Patient is ambulatory without assistive device or brace.  No gross ankle deformity noted.  No foot or ankle joint effusion is noted.  Noted pain on palpation on the plantar aspect of the right heel near the insertion point of the plantar fascia.  No surrounding erythema or edema noted.  Noted tight gastroc complex on the right.  Noted pain on palpation of the peroneal tendons over the lateral aspect of the right foot and ankle.  Noted pain on palpation over the extensor tendons on the dorsal aspect of the right foot.    Diagnostics: Recent radiographs included three views of the right foot demonstrating   no cortical erosions or periosteal elevation.  All joint margins appear stable.  There is no apparent fracture or tumor formation noted.  There is no evidence of foreign body.  The images were independently reviewed by myself along with the patient explaining the findings.      ASSESSMENT / PLAN:     ICD-10-CM    1. Plantar fasciitis, right  M72.2 Ankle/Foot Bracing Supplies Order Ankle Brace; Right     meloxicam (MOBIC) 7.5 MG tablet      2. Heel pain, chronic, right  M79.671 Orthopedic  Referral    G89.29       3. Peroneal tendinitis of right lower leg  M76.71 Ankle/Foot Bracing Supplies Order Ankle Brace; Right     meloxicam (MOBIC) 7.5 MG tablet      4. Extensor tendonitis of foot  M77.8 Ankle/Foot Bracing Supplies Order Ankle Brace; Right     meloxicam (MOBIC) 7.5 MG tablet          I have explained to Malorie about the conditions.  We discussed the underlying contributing factors to the condition as well as treatment options along with expected length of recovery.  At this time, the patient was educated on the importance of offloading supportive shoes and other devices.  I demonstrated to the patient calf stretches to perform every hour daily until symptoms resolve.   After symptoms resolve, the patient was advised to perform the stretches 3 times daily to prevent future recurrence.  The patient was instructed to perform warm soaks with Epson salt after which to also apply over-the-counter Voltaren gel to deeply massage the injured tissue.  The patient was instructed to do this on a daily basis until symptoms resolve.  The patient was fitted with a Tri-Lock ankle brace that will aid in offloading the tension forces to the soft tissues and prevent further inflammation.  To reduce the amount of current inflammation, the patient was prescribed Mobic 7.5 mg to be taken daily with food and instructed to stop taking if any stomach irritation or swelling in extremities are noted.  The patient may return in four weeks for reevaluation to determine if any further treatment will be needed.    Malorie verbalized agreement with and understanding of the rational for the diagnosis and treatment plan.  All questions were answered to best of my ability and the patient's satisfaction. The patient was advised to contact the clinic with any questions that may arise after the clinic visit.      Disclaimer: This note consists of symbols derived from keyboarding, dictation and/or voice recognition software. As a result, there may be errors in the script that have gone undetected. Please consider this when interpreting information found in this chart.       RENNY [email or web address]., F.A.C.F.A.S.      Again, thank you for allowing me to participate in the care of your patient.        Sincerely,        Jeramy Tong DPM

## 2024-04-22 NOTE — PATIENT INSTRUCTIONS

## 2024-04-22 NOTE — PROGRESS NOTES
"PATIENT HISTORY:  Malorie Gill is a 69 year old female who presents to clinic in consultation at the request of  Lydia Zheng PA-C with a chief complaint of right foot pain.  The patient is seen by themselves.  The patient relates the pain is primarily located around the top of the foot.  Reports insidious onset without acute precipitating event. that has been going on for 3 month(s).  The patient has previously tried chiropractic, stretches, Voltaren gel, red light therapy, foot soaks with little relief.   Stepped up and whacked foot on a timber .  The patient is currently employed as RN .  Any previous notes and studies that pertain to the patient's condition were reviewed.    Pertinent medical, surgical and family history was reviewed in the Epic chart.    Past Medical History:   Past Medical History:   Diagnosis Date    Adnexal mass 9/97    complex LEFT adnexal mass    Other and unspecified ovarian cyst        Medications:   Current Outpatient Medications:     diclofenac (VOLTAREN) 1 % topical gel, Apply 2 g topically 4 times daily, Disp: 350 g, Rfl: 1    meloxicam (MOBIC) 7.5 MG tablet, Take 1 tablet (7.5 mg) by mouth daily, Disp: 30 tablet, Rfl: 1     Allergies:    Allergies   Allergen Reactions    Nka [No Known Allergies]        Vitals: /84   Pulse 81   Ht 1.676 m (5' 5.98\")   Wt 64 kg (141 lb)   BMI 22.77 kg/m    BMI= Body mass index is 22.77 kg/m .    LOWER EXTREMITY PHYSICAL EXAM    Dermatologic: Skin is intact to right lower extremity without significant lesions, rash or abrasion.        Vascular: DP & PT pulses are intact & regular on the right.   CFT and skin temperature is normal to the right lower extremity.     Neurologic: Lower extremity sensation is intact to light touch.  No evidence of weakness in the right lower extremity.        Musculoskeletal: Patient is ambulatory without assistive device or brace.  No gross ankle deformity noted.  No foot or ankle joint effusion is noted. "  Noted pain on palpation on the plantar aspect of the right heel near the insertion point of the plantar fascia.  No surrounding erythema or edema noted.  Noted tight gastroc complex on the right.  Noted pain on palpation of the peroneal tendons over the lateral aspect of the right foot and ankle.  Noted pain on palpation over the extensor tendons on the dorsal aspect of the right foot.    Diagnostics: Recent radiographs included three views of the right foot demonstrating   no cortical erosions or periosteal elevation.  All joint margins appear stable.  There is no apparent fracture or tumor formation noted.  There is no evidence of foreign body.  The images were independently reviewed by myself along with the patient explaining the findings.      ASSESSMENT / PLAN:     ICD-10-CM    1. Plantar fasciitis, right  M72.2 Ankle/Foot Bracing Supplies Order Ankle Brace; Right     meloxicam (MOBIC) 7.5 MG tablet      2. Heel pain, chronic, right  M79.671 Orthopedic  Referral    G89.29       3. Peroneal tendinitis of right lower leg  M76.71 Ankle/Foot Bracing Supplies Order Ankle Brace; Right     meloxicam (MOBIC) 7.5 MG tablet      4. Extensor tendonitis of foot  M77.8 Ankle/Foot Bracing Supplies Order Ankle Brace; Right     meloxicam (MOBIC) 7.5 MG tablet          I have explained to Malorie about the conditions.  We discussed the underlying contributing factors to the condition as well as treatment options along with expected length of recovery.  At this time, the patient was educated on the importance of offloading supportive shoes and other devices.  I demonstrated to the patient calf stretches to perform every hour daily until symptoms resolve.  After symptoms resolve, the patient was advised to perform the stretches 3 times daily to prevent future recurrence.  The patient was instructed to perform warm soaks with Epson salt after which to also apply over-the-counter Voltaren gel to deeply massage the  injured tissue.  The patient was instructed to do this on a daily basis until symptoms resolve.  The patient was fitted with a Tri-Lock ankle brace that will aid in offloading the tension forces to the soft tissues and prevent further inflammation.  To reduce the amount of current inflammation, the patient was prescribed Mobic 7.5 mg to be taken daily with food and instructed to stop taking if any stomach irritation or swelling in extremities are noted.  The patient may return in four weeks for reevaluation to determine if any further treatment will be needed.    Malorie verbalized agreement with and understanding of the rational for the diagnosis and treatment plan.  All questions were answered to best of my ability and the patient's satisfaction. The patient was advised to contact the clinic with any questions that may arise after the clinic visit.      Disclaimer: This note consists of symbols derived from keyboarding, dictation and/or voice recognition software. As a result, there may be errors in the script that have gone undetected. Please consider this when interpreting information found in this chart.       MONICA Tong D.P.M., F.MARCEL.C.F.A.S.

## 2024-06-20 ENCOUNTER — HOSPITAL ENCOUNTER (OUTPATIENT)
Dept: MRI IMAGING | Facility: CLINIC | Age: 69
Discharge: HOME | End: 2024-06-20
Attending: PODIATRIST | Admitting: PODIATRIST
Payer: COMMERCIAL

## 2024-06-20 DIAGNOSIS — M76.71 PERONEAL TENDINITIS OF RIGHT LOWER LEG: ICD-10-CM

## 2024-06-20 PROCEDURE — 73721 MRI JNT OF LWR EXTRE W/O DYE: CPT | Mod: RT

## 2024-06-24 ENCOUNTER — TELEPHONE (OUTPATIENT)
Dept: PODIATRY | Facility: CLINIC | Age: 69
End: 2024-06-24
Payer: COMMERCIAL

## 2024-06-24 NOTE — TELEPHONE ENCOUNTER
[facility]    Phone Message    May a detailed message be left on voicemail: yes     Reason for Call: Requesting Results     Name/type of test: MRI  Date of test: 06/20  Was test done at a location other than [facility] (Please fill in the location if not [facility])?: No    Action Taken: Other: Podiatry    Travel Screening: Not Applicable     Date of Service:

## 2024-07-11 ENCOUNTER — OFFICE VISIT (OUTPATIENT)
Dept: PODIATRY | Facility: CLINIC | Age: 69
End: 2024-07-11
Payer: COMMERCIAL

## 2024-07-11 VITALS
HEART RATE: 79 BPM | BODY MASS INDEX: 22.66 KG/M2 | WEIGHT: 141 LBS | SYSTOLIC BLOOD PRESSURE: 134 MMHG | DIASTOLIC BLOOD PRESSURE: 85 MMHG | HEIGHT: 66 IN

## 2024-07-11 DIAGNOSIS — M76.71 PERONEAL TENDINITIS OF RIGHT LOWER LEG: Primary | ICD-10-CM

## 2024-07-11 PROCEDURE — 99213 OFFICE O/P EST LOW 20 MIN: CPT | Performed by: PODIATRIST

## 2024-07-11 NOTE — LETTER
"7/11/2024      Malorie Gill  [address]  [facility] [address]      Dear Colleague,    Thank you for referring your patient, Malorie Gill, to the [facility] [place]. Please see a copy of my visit note below.    Malorie returns to the office for reevaluation of the right foot.  The patient relates following the instructions given at the last visit with noted overall continued pain and minimal  improvement in function of the right foot.   The patient relates no other problems.    Vitals: /85   Pulse 79   Ht 1.676 m (5' 5.98\")   Wt 64 kg (141 lb)   BMI 22.77 kg/m    BMI= Body mass index is 22.77 kg/m .    Lower Extremity Physical Exam:      Neurovascular status remains unchanged.  Muscular exam is within normal limits to major muscle groups.  Integument is intact.      Noted pain on palpation over the lateral aspect of the right foot and ankle along the peroneal tendons.  Mild edema noted.         Assessment:     ICD-10-CM    1. Peroneal tendinitis of right lower leg  M76.71 Physical Therapy  Referral          Plan:    I have explained to Malorie about the progress of the conditions.  At this time, the patient was referred to physical therapy for [place] deep tissue massage of the peroneal tendons on the right foot and ankle.    Malorie verbalized agreement with and understanding of the rational for the diagnosis and treatment plan.  All questions were answered to best of my ability and the patient's satisfaction. The patient was advised to contact the clinic with any questions that may arise after the clinic visit.      Disclaimer: This note consists of symbols derived from keyboarding, dictation and/or voice recognition software. As a result, there may be errors in the script that have gone undetected. Please consider this when interpreting information found in this chart.       MONICA Tong D.P.M., F.A.C.F.A.S.      Again, thank you for allowing me to " participate in the care of your patient.        Sincerely,        Jeramy Tong DPM

## 2024-07-11 NOTE — PROGRESS NOTES
"Malorie returns to the office for reevaluation of the right foot.  The patient relates following the instructions given at the last visit with noted overall continued pain and minimal  improvement in function of the right foot.   The patient relates no other problems.    Vitals: /85   Pulse 79   Ht 1.676 m (5' 5.98\")   Wt 64 kg (141 lb)   BMI 22.77 kg/m    BMI= Body mass index is 22.77 kg/m .    Lower Extremity Physical Exam:      Neurovascular status remains unchanged.  Muscular exam is within normal limits to major muscle groups.  Integument is intact.      Noted pain on palpation over the lateral aspect of the right foot and ankle along the peroneal tendons.  Mild edema noted.         Assessment:     ICD-10-CM    1. Peroneal tendinitis of right lower leg  M76.71 Physical Therapy  Referral          Plan:    I have explained to Malorie about the progress of the conditions.  At this time, the patient was referred to physical therapy for [place] deep tissue massage of the peroneal tendons on the right foot and ankle.    Malorie verbalized agreement with and understanding of the rational for the diagnosis and treatment plan.  All questions were answered to best of my ability and the patient's satisfaction. The patient was advised to contact the clinic with any questions that may arise after the clinic visit.      Disclaimer: This note consists of symbols derived from keyboarding, dictation and/or voice recognition software. As a result, there may be errors in the script that have gone undetected. Please consider this when interpreting information found in this chart.       MONICA Tong D.P.M., F.MARCEL.C.F.A.S.    "

## 2024-07-11 NOTE — NURSING NOTE
"Chief Complaint   Patient presents with    RECHECK     Mri discuss       Initial /85   Pulse 79   Ht 1.676 m (5' 5.98\")   Wt 64 kg (141 lb)   BMI 22.77 kg/m   Estimated body mass index is 22.77 kg/m  as calculated from the following:    Height as of this encounter: 1.676 m (5' 5.98\").    Weight as of this encounter: 64 kg (141 lb).  Medications and allergies reviewed.      Rosa LAWSON MA    "

## 2024-07-11 NOTE — PATIENT INSTRUCTIONS

## 2024-07-12 ENCOUNTER — THERAPY VISIT (OUTPATIENT)
Dept: PHYSICAL THERAPY | Facility: CLINIC | Age: 69
End: 2024-07-12
Attending: PODIATRIST
Payer: COMMERCIAL

## 2024-07-12 DIAGNOSIS — M72.2 PLANTAR FASCIITIS: ICD-10-CM

## 2024-07-12 DIAGNOSIS — M77.8 EXTENSOR TENDONITIS OF FOOT: ICD-10-CM

## 2024-07-12 DIAGNOSIS — M72.2 PLANTAR FASCIITIS, RIGHT: ICD-10-CM

## 2024-07-12 DIAGNOSIS — M76.71 PERONEAL TENDINITIS OF RIGHT LOWER LEG: ICD-10-CM

## 2024-07-12 DIAGNOSIS — G89.29 HEEL PAIN, CHRONIC, RIGHT: ICD-10-CM

## 2024-07-12 DIAGNOSIS — M79.671 HEEL PAIN, CHRONIC, RIGHT: ICD-10-CM

## 2024-07-12 PROCEDURE — 97140 MANUAL THERAPY 1/> REGIONS: CPT | Mod: GP

## 2024-07-12 PROCEDURE — 97110 THERAPEUTIC EXERCISES: CPT | Mod: GP

## 2024-07-12 PROCEDURE — 97161 PT EVAL LOW COMPLEX 20 MIN: CPT | Mod: GP

## 2024-07-12 RX ORDER — MELOXICAM 7.5 MG/1
15 TABLET ORAL DAILY
Qty: 30 TABLET | Refills: 1 | Status: SHIPPED | OUTPATIENT
Start: 2024-07-12 | End: 2024-08-14

## 2024-07-12 ASSESSMENT — ACTIVITIES OF DAILY LIVING (ADL)
PERFORMING_LIGHT_ACTIVITIES_AROUND_YOUR_HOME: A LITTLE BIT OF DIFFICULTY
RUNNING_ON_EVEN_GROUND: EXTREME DIFFICULTY OR UNABLE TO PERFORM ACTIVITY
PERFORMING_HEAVY_ACTIVITIES_AROUND_YOUR_HOME: EXTREME DIFFICULTY OR UNABLE TO PERFORM ACTIVITY
LEFS_RAW_SCORE: 0
YOUR_USUAL_HOBBIES,_RECREATIONAL_OR_SPORTING_ACTIVITIES: EXTREME DIFFICULTY OR UNABLE TO PERFORM ACTIVITY
GETTING_INTO_AND_OUT_OF_A_BATH: QUITE A BIT OF DIFFICULTY
SITTING_FOR_1_HOUR: MODERATE DIFFICULTY
GOING_UP_OR_DOWN_10_STAIRS: QUITE A BIT OF DIFFICULTY
WALKING_A_MILE: EXTREME DIFFICULTY OR UNABLE TO PERFORM ACTIVITY
RUNNING_ON_UNEVEN_GROUND: EXTREME DIFFICULTY OR UNABLE TO PERFORM ACTIVITY
SHOPPING: EXTREME DIFFICULTY OR UNABLE TO PERFORM ACTIVITY
GETTING_INTO_OR_OUT_OF_A_CAR: MODERATE DIFFICULTY
STANDING_FOR_1_HOUR: QUITE A BIT OF DIFFICULTY
WALKING_2_BLOCKS: QUITE A BIT OF DIFFICULTY
PLEASE_INDICATE_YOR_PRIMARY_REASON_FOR_REFERRAL_TO_THERAPY:: FOOT AND/OR ANKLE
ROLLING_OVER_IN_BED: MODERATE DIFFICULTY
WALKING_BETWEEN_ROOMS: MODERATE DIFFICULTY
LEFS_SCORE(%): 0
PUTTING_ON_YOUR_SHOES_OR_SOCKS: A LITTLE BIT OF DIFFICULTY
LIFTING_AN_OBJECT,_LIKE_A_BAG_OF_GROCERIES_FROM_THE_FLOOR: A LITTLE BIT OF DIFFICULTY
ANY_OF_YOUR_USUAL_WORK,_HOUSEWORK_OR_SCHOOL_ACTIVITIES: QUITE A BIT OF DIFFICULTY
MAKING_SHARP_TURNS_WHILE_RUNNING_FAST: EXTREME DIFFICULTY OR UNABLE TO PERFORM ACTIVITY
SQUATTING: QUITE A BIT OF DIFFICULTY

## 2024-07-12 NOTE — PROGRESS NOTES
PHYSICAL THERAPY EVALUATION  Type of Visit: Evaluation              Subjective       Presenting condition or subjective complaint: ankle and knee pain    Patient presents for evaluation of R foot/ankle pain. Pt notes symptoms started with plantar fasciitis in feb 2024, some stretching helped. Had a fall shortly after this, had an XR of the R foot at that time. Then had an MRI. Saw Podiatry yesterday and reivewed MRI. Pt does have pain of R lateral ankle, mildly into arch, and heel. 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 Had a boot but was uncomfortable. Got a new velcro splint to wear which she wears at work. Has worn orthotics because of her knees for years. Does have some numbness/tingling in lateral ankle and some tingling in calf. Does feels he has some neuropathy in the bottom of feet but feels it is worse with swelling.     Date of onset: 07/11/24    Relevant medical history:     Dates & types of surgery: none    Prior diagnostic imaging/testing results: MRI; X-ray     MRI R Foot  IMPRESSION:  1.   Tendinosis and partial tear of the peroneus longus tendon at the lateral malleolus. There is also surrounding fluid consistent with tenosynovitis. There is also tendinosis further distally in the peroneus longus tendon, and there are hypertrophic  changes and marrow edema at the articulation between the os peroneum and the cuboid which may be symptomatic.  2.  Lateral corner talar dome chondromalacia and underlying reactive changes.  3.  Plantar calcaneal enthesophyte with marrow edema. There is also thickening and increased signal in the adjacent plantar fascia. These findings would be evidence of plantar fasciitis if there are symptoms in this area.  4.  Multifocal muscle atrophy.   Prior therapy history for the same diagnosis, illness or injury:        Prior Level of Function  Transfers: Independent  Ambulation: Independent  ADL:  Independent    Living Environment  Social support: With a significant other or spouse   Type of home: [facility]   Stairs to enter the home: No       Ramp: No   Stairs inside the home: No       Help at home: None  Equipment owned:       Employment: Yes    Hobbies/Interests:      Patient goals for therapy: weight bearing activities,walk and run kneel squat     Objective   FOOT/ANKLE EVALUATION  PAIN: Pain Level at Rest: 3/10  Pain Level with Use: 9/10  Pain Location: ankle and foot   Pain Quality: Aching  Pain Frequency: constant  Pain is Worst: daytime  Pain is Exacerbated By: turning over in bed, standing, walking, stairs, squatting  Pain is Relieved By: heat and otc medications  INTEGUMENTARY (edema, incisions):  Mild edema both knees; mild edema R lateral malleolus  POSTURE: Standing Posture: WFL  GAIT:   Weightbearing Status: WBAT  Assistive Device(s): None  Gait Deviations: Antalgic  Ayde decreased  WEIGHT BEARING ALIGNMENT: Foot/Ankle WB Alignment:Pes cavus L, Pes cavus R  ROM:   (Degrees) Left AROM Left PROM  Right AROM Right PROM   Ankle Dorsiflexion WFL  2    Ankle Plantarflexion WFL  58    Ankle Inversion WFL  44    Ankle Eversion WFL  25    Pain at end AROM for R ankle kayla/inv  STRENGTH:   Pain: - none + mild ++ moderate +++ severe  Strength Scale: 0-5/5 Left Right   Ankle Dorsiflexion 5 5-   Ankle Plantarflexion 5 4+, + (mild)   Ankle Inversion 5 4, + (mild)   Ankle Eversion 5 4-, ++ (mod)   Great Toe Flexion  5   Great Toe Extension  5     FLEXIBILITY: Decreased gastroc R, Decreased soleus R  PALPATION:  TTP to R fibularis tendons and longus muscle belly, plantar fascia  JOINT MOBILITY:  Stiffness w/PA glide R talocrural joint    Assessment & Plan   CLINICAL IMPRESSIONS  Medical Diagnosis: Peroneal tendinitis of right lower leg (M76.71)    Treatment Diagnosis: Right foot/ankle pain   Impression/Assessment: Patient is a 69 year old female with R foot and ankle pain complaints.  The following significant  findings have been identified: Pain, Decreased ROM/flexibility, Decreased joint mobility, Decreased strength, Impaired balance, Inflammation, Edema, Impaired gait, Impaired muscle performance, Decreased activity tolerance, Impaired posture, and Instability. These impairments interfere with their ability to perform work tasks, recreational activities, household chores, household mobility, and community mobility as compared to previous level of function.     Clinical Decision Making (Complexity):  Clinical Presentation: Stable/Uncomplicated  Clinical Presentation Rationale: based on medical and personal factors listed in PT evaluation  Clinical Decision Making (Complexity): Low complexity    PLAN OF CARE  Treatment Interventions:  Modalities: E-stim, Ultrasound  Interventions: Gait Training, Manual Therapy, Neuromuscular Re-education, Therapeutic Activity, Therapeutic Exercise, Self-Care/Home Management    Long Term Goals     PT Goal 1  Goal Identifier: STG  Goal Description: Patient will be able to independently ambulate 1 mile or greater over uneven surfaces w/>3/10 R ankle pain to improve community mobility.  Target Date: 08/16/24  PT Goal 2  Goal Identifier: STG  Goal Description: Patient will be able to navigate a flight of stairs with reciprocal pattern and <3/10 R ankle pain to improve household mobility.  Target Date: 08/16/24  PT Goal 3  Goal Identifier: LTG  Goal Description: Patient will be able to jog, cut and pivot without R ankle limitation to return to recreational activities.  Target Date: 09/20/24  PT Goal 4  Goal Identifier: LTG  Goal Description: Patient will be independent with HEP for self-management of symptoms.  Target Date: 09/20/24      Frequency of Treatment: 1x/week  Duration of Treatment: 10 weeks    Education Assessment:   Learner/Method: Patient;Demonstration;Pictures/Video;No Barriers to Learning;Reading;Listening    Risks and benefits of evaluation/treatment have been explained.    Patient/Family/caregiver agrees with Plan of Care.     Evaluation Time:     PT Eval, Low Complexity Minutes (14101): 20    Signing Clinician: Natalee Rosario, PT        [facility]                                                                                   OUTPATIENT PHYSICAL THERAPY      PLAN OF TREATMENT FOR OUTPATIENT REHABILITATION   Patient's Last Name, First Name, Malorie Ahumada YOB: 1955   Provider's Name   [facility]   Medical Record No.  [id number]     Onset Date: 07/11/24  Start of Care Date: 07/12/24     Medical Diagnosis:  Peroneal tendinitis of right lower leg (M76.71)      PT Treatment Diagnosis:  Right foot/ankle pain Plan of Treatment  Frequency/Duration: 1x/week/ 10 weeks    Certification date from 07/12/24 to 09/20/24         See note for plan of treatment details and functional goals     Natalee Rosario, PT                         I CERTIFY THE NEED FOR THESE SERVICES FURNISHED UNDER        THIS PLAN OF TREATMENT AND WHILE UNDER MY CARE     (Physician attestation of this document indicates review and certification of the therapy plan).              Referring Provider:  Jeramy Tong    Initial Assessment  See Epic Evaluation- Start of Care Date: 07/12/24

## 2024-07-16 ENCOUNTER — THERAPY VISIT (OUTPATIENT)
Dept: PHYSICAL THERAPY | Facility: CLINIC | Age: 69
End: 2024-07-16
Attending: PODIATRIST
Payer: COMMERCIAL

## 2024-07-16 DIAGNOSIS — M76.71 PERONEAL TENDINITIS OF RIGHT LOWER LEG: Primary | ICD-10-CM

## 2024-07-16 PROCEDURE — 97110 THERAPEUTIC EXERCISES: CPT | Mod: GP | Performed by: PHYSICAL THERAPIST

## 2024-07-25 ENCOUNTER — THERAPY VISIT (OUTPATIENT)
Dept: PHYSICAL THERAPY | Facility: CLINIC | Age: 69
End: 2024-07-25
Attending: PODIATRIST
Payer: COMMERCIAL

## 2024-07-25 DIAGNOSIS — M76.71 PERONEAL TENDINITIS OF RIGHT LOWER LEG: Primary | ICD-10-CM

## 2024-07-25 PROCEDURE — 97140 MANUAL THERAPY 1/> REGIONS: CPT | Mod: GP

## 2024-07-25 PROCEDURE — 97110 THERAPEUTIC EXERCISES: CPT | Mod: GP

## 2024-08-01 ENCOUNTER — THERAPY VISIT (OUTPATIENT)
Dept: PHYSICAL THERAPY | Facility: CLINIC | Age: 69
End: 2024-08-01
Attending: PODIATRIST
Payer: COMMERCIAL

## 2024-08-01 DIAGNOSIS — M76.71 PERONEAL TENDINITIS OF RIGHT LOWER LEG: Primary | ICD-10-CM

## 2024-08-01 PROCEDURE — 97140 MANUAL THERAPY 1/> REGIONS: CPT | Mod: GP

## 2024-08-01 PROCEDURE — 97110 THERAPEUTIC EXERCISES: CPT | Mod: GP

## 2024-08-09 ENCOUNTER — THERAPY VISIT (OUTPATIENT)
Dept: PHYSICAL THERAPY | Facility: CLINIC | Age: 69
End: 2024-08-09
Attending: PODIATRIST
Payer: COMMERCIAL

## 2024-08-09 DIAGNOSIS — M76.71 PERONEAL TENDINITIS OF RIGHT LOWER LEG: Primary | ICD-10-CM

## 2024-08-09 PROCEDURE — 97140 MANUAL THERAPY 1/> REGIONS: CPT | Mod: GP

## 2024-08-09 PROCEDURE — 97110 THERAPEUTIC EXERCISES: CPT | Mod: GP

## 2024-08-14 ENCOUNTER — OFFICE VISIT (OUTPATIENT)
Dept: PODIATRY | Facility: CLINIC | Age: 69
End: 2024-08-14
Payer: COMMERCIAL

## 2024-08-14 ENCOUNTER — THERAPY VISIT (OUTPATIENT)
Dept: PHYSICAL THERAPY | Facility: CLINIC | Age: 69
End: 2024-08-14
Attending: PODIATRIST
Payer: COMMERCIAL

## 2024-08-14 VITALS
SYSTOLIC BLOOD PRESSURE: 153 MMHG | WEIGHT: 141 LBS | HEART RATE: 73 BPM | HEIGHT: 66 IN | BODY MASS INDEX: 22.66 KG/M2 | DIASTOLIC BLOOD PRESSURE: 89 MMHG

## 2024-08-14 DIAGNOSIS — M76.71 PERONEAL TENDINITIS OF RIGHT LOWER LEG: Primary | ICD-10-CM

## 2024-08-14 PROCEDURE — 99213 OFFICE O/P EST LOW 20 MIN: CPT | Performed by: PODIATRIST

## 2024-08-14 PROCEDURE — 97140 MANUAL THERAPY 1/> REGIONS: CPT | Mod: GP

## 2024-08-14 PROCEDURE — 97110 THERAPEUTIC EXERCISES: CPT | Mod: GP

## 2024-08-14 NOTE — LETTER
"8/14/2024      Malorie Gill  [address]  [facility] [address]      Dear Colleague,    Thank you for referring your patient, Malorie Gill, to the [facility] [place]. Please see a copy of my visit note below.    Malorie returns to the office for reevaluation of the right foot.  The patient relates following the instructions given at the last visit with noted overall less pain and more improvement in function of the right foot.   The patient relates no other problems.    Vitals: BP (!) 153/89   Pulse 73   Ht 1.676 m (5' 5.98\")   Wt 64 kg (141 lb)   BMI 22.77 kg/m    BMI= Body mass index is 22.77 kg/m .    Lower Extremity Physical Exam:      Neurovascular status remains unchanged.  Muscular exam is within normal limits to major muscle groups.  Integument is intact.      Noted decreased pain on palpation over the peroneal tendons on the right.  Decreased edema noted.         Assessment:     ICD-10-CM    1. Peroneal tendinitis of right lower leg  M76.71           Plan:    I have explained to Malorie about the progress of the conditions.  At this time, the patient was instructed to continue Physical Therapy.  She was instructed to transition out of the CAM boot and into her ankle brace.  The patient may return to the office if problems persist.    Malorie verbalized agreement with and understanding of the rational for the diagnosis and treatment plan.  All questions were answered to best of my ability and the patient's satisfaction. The patient was advised to contact the clinic with any questions that may arise after the clinic visit.      Disclaimer: This note consists of symbols derived from keyboarding, dictation and/or voice recognition software. As a result, there may be errors in the script that have gone undetected. Please consider this when interpreting information found in this chart.       MONICA Tong D.P.M., F.A.C.F.A.S.      Again, thank you for allowing me to " participate in the care of your patient.        Sincerely,        Jeramy Tong DPM

## 2024-08-14 NOTE — PROGRESS NOTES
"Malorie returns to the office for reevaluation of the right foot.  The patient relates following the instructions given at the last visit with noted overall less pain and more improvement in function of the right foot.   The patient relates no other problems.    Vitals: BP (!) 153/89   Pulse 73   Ht 1.676 m (5' 5.98\")   Wt 64 kg (141 lb)   BMI 22.77 kg/m    BMI= Body mass index is 22.77 kg/m .    Lower Extremity Physical Exam:      Neurovascular status remains unchanged.  Muscular exam is within normal limits to major muscle groups.  Integument is intact.      Noted decreased pain on palpation over the peroneal tendons on the right.  Decreased edema noted.         Assessment:     ICD-10-CM    1. Peroneal tendinitis of right lower leg  M76.71           Plan:    I have explained to Malorie about the progress of the conditions.  At this time, the patient was instructed to continue Physical Therapy.  She was instructed to transition out of the CAM boot and into her ankle brace.  The patient may return to the office if problems persist.    Malorie verbalized agreement with and understanding of the rational for the diagnosis and treatment plan.  All questions were answered to best of my ability and the patient's satisfaction. The patient was advised to contact the clinic with any questions that may arise after the clinic visit.      Disclaimer: This note consists of symbols derived from keyboarding, dictation and/or voice recognition software. As a result, there may be errors in the script that have gone undetected. Please consider this when interpreting information found in this chart.       MONICA Tong D.P.M., F.MARCEL.C.F.A.S.    "

## 2024-08-14 NOTE — NURSING NOTE
"Chief Complaint   Patient presents with    RECHECK     One month therapy and MRI- no concerns       Initial BP (!) 153/89   Pulse 73   Ht 1.676 m (5' 5.98\")   Wt 64 kg (141 lb)   BMI 22.77 kg/m   Estimated body mass index is 22.77 kg/m  as calculated from the following:    Height as of this encounter: 1.676 m (5' 5.98\").    Weight as of this encounter: 64 kg (141 lb).  Medications and allergies reviewed.      Rosa LAWSON MA    "

## 2024-08-21 ENCOUNTER — THERAPY VISIT (OUTPATIENT)
Dept: PHYSICAL THERAPY | Facility: CLINIC | Age: 69
End: 2024-08-21
Attending: PODIATRIST
Payer: COMMERCIAL

## 2024-08-21 DIAGNOSIS — M76.71 PERONEAL TENDINITIS OF RIGHT LOWER LEG: Primary | ICD-10-CM

## 2024-08-21 PROCEDURE — 97140 MANUAL THERAPY 1/> REGIONS: CPT | Mod: GP | Performed by: PHYSICAL THERAPIST

## 2024-08-21 PROCEDURE — 97110 THERAPEUTIC EXERCISES: CPT | Mod: GP | Performed by: PHYSICAL THERAPIST

## 2024-08-28 ENCOUNTER — THERAPY VISIT (OUTPATIENT)
Dept: PHYSICAL THERAPY | Facility: CLINIC | Age: 69
End: 2024-08-28
Attending: PODIATRIST
Payer: COMMERCIAL

## 2024-08-28 DIAGNOSIS — M76.71 PERONEAL TENDINITIS OF RIGHT LOWER LEG: Primary | ICD-10-CM

## 2024-08-28 PROCEDURE — 97140 MANUAL THERAPY 1/> REGIONS: CPT | Mod: GP

## 2024-08-28 PROCEDURE — 97110 THERAPEUTIC EXERCISES: CPT | Mod: GP

## 2024-09-04 ENCOUNTER — THERAPY VISIT (OUTPATIENT)
Dept: PHYSICAL THERAPY | Facility: CLINIC | Age: 69
End: 2024-09-04
Attending: PODIATRIST
Payer: COMMERCIAL

## 2024-09-04 DIAGNOSIS — M76.71 PERONEAL TENDINITIS OF RIGHT LOWER LEG: Primary | ICD-10-CM

## 2024-09-04 PROCEDURE — 97110 THERAPEUTIC EXERCISES: CPT | Mod: GP

## 2024-09-04 PROCEDURE — 97140 MANUAL THERAPY 1/> REGIONS: CPT | Mod: GP

## 2024-09-11 ENCOUNTER — THERAPY VISIT (OUTPATIENT)
Dept: PHYSICAL THERAPY | Facility: CLINIC | Age: 69
End: 2024-09-11
Attending: PODIATRIST
Payer: COMMERCIAL

## 2024-09-11 DIAGNOSIS — M76.71 PERONEAL TENDINITIS OF RIGHT LOWER LEG: Primary | ICD-10-CM

## 2024-09-11 PROCEDURE — 97140 MANUAL THERAPY 1/> REGIONS: CPT | Mod: GP

## 2024-09-11 PROCEDURE — 97110 THERAPEUTIC EXERCISES: CPT | Mod: GP

## 2024-09-11 NOTE — PROGRESS NOTES
09/11/24 0500   Appointment Info   Signing clinician's name / credentials Natalee Rosario, PT, DPT   Visits Used 10   Medical Diagnosis Peroneal tendinitis of right lower leg (M76.71)   PT Tx Diagnosis Right foot/ankle pain   Progress Note/Certification   Start of Care Date 07/12/24   Onset of illness/injury or Date of Surgery 07/11/24   Therapy Frequency Every 2-3 weeks   Predicted Duration 10 weeks   Certification date from 09/11/24   Certification date to 11/20/24   Progress Note Due Date 09/20/24   Progress Note Completed Date 07/12/24   GOALS   PT Goals 3;2;4   PT Goal 1   Goal Identifier STG   Goal Description Patient will be able to independently ambulate 1 mile or greater over uneven surfaces w/>3/10 R ankle pain to improve community mobility.   Goal Progress MET   Target Date 08/16/24   Date Met 09/11/24   PT Goal 2   Goal Identifier STG   Goal Description Patient will be able to navigate a flight of stairs with reciprocal pattern and <3/10 R ankle pain to improve household mobility.   Goal Progress Progressing - down step to, up reciprocal   Target Date 10/16/24   PT Goal 3   Goal Identifier LTG   Goal Description Patient will be able to jog, cut and pivot without R ankle limitation to return to recreational activities.   Goal Progress Has not yet attempted recreational activity   Target Date 11/20/24   PT Goal 4   Goal Identifier LTG   Goal Description Patient will be independent with HEP for self-management of symptoms.   Goal Progress Continuously updating   Target Date 11/20/24   Subjective Report   Subjective Report Pt reports she is doing well overall with the ankle, not as much use of brace since she hasn't been working as many shifts. Pt using [place] track at home to strengthen, doesn't bother ankle but does the knees a bit.   Objective Measures   Objective Measures Objective Measure 1;Objective Measure 2;Objective Measure 3;Objective Measure 4;Objective Measure 5   Objective Measure 1  "  Objective Measure R ankle AROM   Details DF 4*, PF 64*, inv 42*, kayla 27*   Objective Measure 2   Objective Measure R ankle MMT   Details Ankle DF 5/5; ankle PF 4+/5; ankle inversion 5-/5; ankle eversion 5/5 (mild pain w/eversion testing)   Objective Measure 3   Objective Measure Palpation   Details Mild TTP distal fibularis tendons R   Objective Measure 4   Objective Measure Pain   Details 0/10 R ankle   Objective Measure 5   Objective Measure SLS R   Details 30 seconds (better balance/stability as time went on)   Treatment Interventions (PT)   Interventions Therapeutic Procedure/Exercise;Manual Therapy   Therapeutic Procedure/Exercise   Therapeutic Procedures: strength, endurance, ROM, flexibility minutes (18882) 15   Ther Proc 1 - Details Standing DL heel raises x10 w/UE support (mild pain with increased reps); standing SL heel raises x 8 R; standing gastroc and soleus stretches x 30\" ea at counter; SLS 2x30\" B - flat ground. Standing knee to wall ankle stretch x5 w/5\" holds R. Time spent updating progress note measurements, goals for PT, HEP and plan of care.   Skilled Intervention Cueing for exercise speed, pain tolerance, technique   Patient Response/Progress Pt doing well overall, mildly limited by bilateral knee pain more than ankle at this point   Manual Therapy   Manual Therapy: Mobilization, MFR, MLD, friction massage minutes (87343) 15   Manual Therapy 1 - Details STM to R fibularis longus/brevis muscle belly and tendons, gastroc and soleus, plantar fascia in prone.   Skilled Intervention MT as above to improve tissue mobility and reduce pain   Patient Response/Progress Less overall tenderness and swelling R ankle   Education   Learner/Method Patient;Demonstration;Pictures/Video;No Barriers to Learning;Reading;Listening   Plan   Home program PTRX - pin to phone   Updates to plan of care Reduce to 2-3x/week   Plan for next session MT as indicated, lateral ankle stability, SL balance, light jogging as " able   Total Session Time   Timed Code Treatment Minutes 30   Total Treatment Time (sum of timed and untimed services) 30       [facility]                                                                                   OUTPATIENT PHYSICAL THERAPY    PLAN OF TREATMENT FOR OUTPATIENT REHABILITATION   Patient's Last Name, First Name, Malorie Ahumada YOB: 1955   Provider's Name   [facility]   Medical Record No.  [id number]     Onset Date: 07/11/24  Start of Care Date: 07/12/24     Medical Diagnosis:  Peroneal tendinitis of right lower leg (M76.71)      PT Treatment Diagnosis:  Right foot/ankle pain Plan of Treatment  Frequency/Duration: Every 2-3 weeks/ 10 weeks    Certification date from 09/11/24 to 11/20/24         See note for plan of treatment details and functional goals     Natalee Rosario, PT                         I CERTIFY THE NEED FOR THESE SERVICES FURNISHED UNDER        THIS PLAN OF TREATMENT AND WHILE UNDER MY CARE     (Physician attestation of this document indicates review and certification of the therapy plan).              Referring Provider:  Jeramy Tong    Initial Assessment  See Epic Evaluation- Start of Care Date: 07/12/24    PLAN  Continue therapy per current plan of care w/reduced frequency of visits. Pt doing well overall, much better pain, ROM and strength of R ankle, overall functional mobility with walking, stairs and work duties. Still with some restriction of R lateral ankle stability and strength, working on weaning out of brace during walking/long periods of standing. Patient would benefit from a few more visits of PT to improve impairments and functional limitations to return to OF.     Beginning/End Dates of Progress Note Reporting Period:  07/12/24 to 09/11/2024    Referring Provider:  Jeramy Tong

## 2024-09-19 ENCOUNTER — THERAPY VISIT (OUTPATIENT)
Dept: PHYSICAL THERAPY | Facility: CLINIC | Age: 69
End: 2024-09-19
Attending: PODIATRIST
Payer: COMMERCIAL

## 2024-09-19 DIAGNOSIS — M76.71 PERONEAL TENDINITIS OF RIGHT LOWER LEG: Primary | ICD-10-CM

## 2024-09-19 PROCEDURE — 97140 MANUAL THERAPY 1/> REGIONS: CPT | Mod: GP

## 2024-09-19 PROCEDURE — 97110 THERAPEUTIC EXERCISES: CPT | Mod: GP

## 2024-10-14 ENCOUNTER — PATIENT OUTREACH (OUTPATIENT)
Dept: CARE COORDINATION | Facility: CLINIC | Age: 69
End: 2024-10-14
Payer: COMMERCIAL

## 2024-10-28 ENCOUNTER — PATIENT OUTREACH (OUTPATIENT)
Dept: CARE COORDINATION | Facility: CLINIC | Age: 69
End: 2024-10-28
Payer: COMMERCIAL

## 2024-11-06 ENCOUNTER — TRANSFERRED RECORDS (OUTPATIENT)
Dept: HEALTH INFORMATION MANAGEMENT | Facility: CLINIC | Age: 69
End: 2024-11-06
Payer: COMMERCIAL

## 2024-11-20 ENCOUNTER — TRANSFERRED RECORDS (OUTPATIENT)
Dept: HEALTH INFORMATION MANAGEMENT | Facility: CLINIC | Age: 69
End: 2024-11-20
Payer: COMMERCIAL

## 2024-12-29 ENCOUNTER — HEALTH MAINTENANCE LETTER (OUTPATIENT)
Age: 69
End: 2024-12-29

## 2025-05-12 ENCOUNTER — PATIENT OUTREACH (OUTPATIENT)
Dept: CARE COORDINATION | Facility: CLINIC | Age: 70
End: 2025-05-12
Payer: COMMERCIAL

## 2025-05-28 ENCOUNTER — TRANSFERRED RECORDS (OUTPATIENT)
Dept: HEALTH INFORMATION MANAGEMENT | Facility: CLINIC | Age: 70
End: 2025-05-28
Payer: COMMERCIAL

## 2025-05-28 ENCOUNTER — HOSPITAL ENCOUNTER (OUTPATIENT)
Dept: MAMMOGRAPHY | Facility: CLINIC | Age: 70
Discharge: HOME | End: 2025-05-28
Attending: NURSE PRACTITIONER
Payer: COMMERCIAL

## 2025-05-28 ENCOUNTER — MYC MEDICAL ADVICE (OUTPATIENT)
Dept: FAMILY MEDICINE | Facility: CLINIC | Age: 70
End: 2025-05-28
Payer: COMMERCIAL

## 2025-05-28 DIAGNOSIS — Z12.31 BREAST CANCER SCREENING BY MAMMOGRAM: ICD-10-CM

## 2025-05-28 DIAGNOSIS — R92.8 BREAST LESION ON MAMMOGRAPHY: Primary | ICD-10-CM

## 2025-05-28 PROCEDURE — 77063 BREAST TOMOSYNTHESIS BI: CPT

## 2025-06-04 ENCOUNTER — TRANSFERRED RECORDS (OUTPATIENT)
Dept: HEALTH INFORMATION MANAGEMENT | Facility: CLINIC | Age: 70
End: 2025-06-04
Payer: COMMERCIAL

## 2025-06-06 ENCOUNTER — HOSPITAL ENCOUNTER (OUTPATIENT)
Dept: ULTRASOUND IMAGING | Facility: CLINIC | Age: 70
Discharge: HOME | End: 2025-06-06
Attending: NURSE PRACTITIONER
Payer: COMMERCIAL

## 2025-06-06 ENCOUNTER — HOSPITAL ENCOUNTER (OUTPATIENT)
Dept: MAMMOGRAPHY | Facility: CLINIC | Age: 70
Discharge: HOME | End: 2025-06-06
Attending: NURSE PRACTITIONER
Payer: COMMERCIAL

## 2025-06-06 DIAGNOSIS — N63.21 MASS OF UPPER OUTER QUADRANT OF LEFT BREAST: ICD-10-CM

## 2025-06-06 DIAGNOSIS — N63.24 MASS OF LOWER INNER QUADRANT OF LEFT BREAST: ICD-10-CM

## 2025-06-06 PROCEDURE — 88360 TUMOR IMMUNOHISTOCHEM/MANUAL: CPT | Mod: TC | Performed by: NURSE PRACTITIONER

## 2025-06-06 PROCEDURE — 272N000031 US BREAST BIOPSY CORE NEEDLE LEFT

## 2025-06-06 PROCEDURE — 999N000065 MA POST PROCEDURE LEFT

## 2025-06-06 PROCEDURE — 272N000712 US BREAST BIOPSY CORE NEEDLE, EA ADDL LESION LEFT

## 2025-06-06 PROCEDURE — 250N000009 HC RX 250: Performed by: RADIOLOGY

## 2025-06-06 PROCEDURE — 88341 IMHCHEM/IMCYTCHM EA ADD ANTB: CPT | Mod: TC | Performed by: NURSE PRACTITIONER

## 2025-06-06 PROCEDURE — A4648 IMPLANTABLE TISSUE MARKER: HCPCS

## 2025-06-06 RX ADMIN — LIDOCAINE HYDROCHLORIDE 5 ML: 10 INJECTION, SOLUTION EPIDURAL; INFILTRATION; INTRACAUDAL; PERINEURAL at 09:40

## 2025-06-07 ENCOUNTER — HOSPITAL ENCOUNTER (OUTPATIENT)
Dept: MRI IMAGING | Facility: CLINIC | Age: 70
Discharge: HOME | End: 2025-06-07
Attending: NURSE PRACTITIONER | Admitting: NURSE PRACTITIONER
Payer: COMMERCIAL

## 2025-06-07 DIAGNOSIS — R79.89 ELEVATED TESTOSTERONE LEVEL: ICD-10-CM

## 2025-06-07 PROCEDURE — 74181 MRI ABDOMEN W/O CONTRAST: CPT

## 2025-06-07 RX ORDER — GADOBUTROL 604.72 MG/ML
6 INJECTION INTRAVENOUS ONCE
Status: DISCONTINUED | OUTPATIENT
Start: 2025-06-07 | End: 2025-06-08 | Stop reason: HOSPADM

## 2025-06-09 ENCOUNTER — RESULTS FOLLOW-UP (OUTPATIENT)
Dept: FAMILY MEDICINE | Facility: CLINIC | Age: 70
End: 2025-06-09

## 2025-06-10 ENCOUNTER — TELEPHONE (OUTPATIENT)
Dept: MAMMOGRAPHY | Facility: CLINIC | Age: 70
End: 2025-06-10

## 2025-06-10 DIAGNOSIS — C50.912 MALIGNANT NEOPLASM OF LEFT BREAST (H): Primary | ICD-10-CM

## 2025-06-10 LAB
PATH REPORT.COMMENTS IMP SPEC: ABNORMAL
PATH REPORT.COMMENTS IMP SPEC: YES
PATH REPORT.FINAL DX SPEC: ABNORMAL
PATH REPORT.GROSS SPEC: ABNORMAL
PATH REPORT.MICROSCOPIC SPEC OTHER STN: ABNORMAL
PATHOLOGY SYNOPTIC REPORT: ABNORMAL
PHOTO IMAGE: ABNORMAL

## 2025-06-10 PROCEDURE — 88360 TUMOR IMMUNOHISTOCHEM/MANUAL: CPT | Mod: 26 | Performed by: PATHOLOGY

## 2025-06-10 PROCEDURE — 88341 IMHCHEM/IMCYTCHM EA ADD ANTB: CPT | Mod: 26 | Performed by: PATHOLOGY

## 2025-06-10 PROCEDURE — 88342 IMHCHEM/IMCYTCHM 1ST ANTB: CPT | Mod: 26 | Performed by: PATHOLOGY

## 2025-06-10 PROCEDURE — 88305 TISSUE EXAM BY PATHOLOGIST: CPT | Mod: 26 | Performed by: PATHOLOGY

## 2025-06-10 NOTE — TELEPHONE ENCOUNTER
Malignant Path:    Pathology report reviewed with our breast radiologist Dr. Blake Hernandez, who confirmed the recent breast imaging is concordant with the final surgical pathology results below.     I left a message asking Pat to return my call to discuss the results at her earliest convenience. Once she returns my call, I will inform her of the results and help arrange follow up.      Awaiting call back.       HELENA Vences BSN  Procedure Nurse  [facility]  [phone]    ----------------------------------------------------------------------------------------------------------------------------------------    [facility]  Malorie Gill SHERMAN [id number]  F, 1955  Surgical Pathology Report (Final result) [id number]  Authorizing Provider: Ordering Provider:  Ordering Location: Collected: 06/06/2025 10:25 AM  Pathologist: Edith Bolaños MD Received: 06/06/2025 10:50 AM  .  Specimens  A Breast, Left, US Breast Needle Biopsy, Additional  B Breast, Left, Breast Biospy Needle  C Breast, Left, US Breast Needle Biopsy, Additional  .  .  Final Diagnosis  A) LEFT BREAST, 7:00, 3 CM FN, ULTRASOUND-GUIDED CORE BIOPSY:  1. INVASIVE LOBULAR CARCINOMA:  a. GRADE: SLADE GRADE I (OF III) (TUBULES 3; NUCLEI 1; MITOSES 1)  b. SCORE: 5 (OF 9)  c. LENGTH: 7 MM  2. DUCTAL CARCINOMA IN SITU (DCIS):  - NOT IDENTIFIED  3. ADDITIONAL FINDINGS: DENSE STROMAL FIBROSIS  4. ADDITIONAL STUDIES:  a. ESTROGEN RECEPTOR: POSITIVE (%)  b. PROGESTERONE RECEPTOR: POSITIVE (11-20%)  c. HER-2/RUBENS: NEGATIVE (1+)  d. E-CADHERIN: NEGATIVE; CONSISTENT WITH LOBULAR CARCINOMA    B) LEFT BREAST, 1:00, 5 CM FN, ULTRASOUND-GUIDED CORE BIOPSY:  1. INVASIVE DUCTAL CARCINOMA WITH TUBULAR FEATURES:  a. GRADE: SLADE GRADE I (of III) (TUBULES 1; NUCLEI 1; MITOSES 1)  b. SCORE: 3 (OF 9)  c. LENGTH: 1 MM  2. ATYPICAL DUCTAL HYPERPLASIA; QUANTITATIVELY INSUFFICIENT FOR DIAGNOSIS  OF LOW-GRADE DCIS  3. ADDITIONAL FINDINGS: CARCINOMA  INVOLVING A FIBROADENOMA  4. ADDITIONAL STUDIES:  a. ESTROGEN RECEPTOR: POSITIVE (%)  b. PROGESTERONE RECEPTOR: POSITIVE (81-90%)  c. HER-2/RUBENS: NEGATIVE (0+)  d. P63: NEGATIVE IN FOCUS OF INVASION  e. SMOOTH MUSCLE MYOSIN: NEGATIVE IN FOCUS OF INVASION    C) LEFT BREAST, 8:00, 5 CM FN, ULTRASOUND-GUIDED CORE BIOPSY:  1. INVASIVE LOBULAR CARCINOMA:  a. GRADE: SLADE GRADE I (of III) (TUBULES 3; NUCLEI 1; MITOSES 1)  b. SCORE: 5 (OF 9)  c. LENGTH: 11 MM  2. DUCTAL CARCINOMA IN SITU (DCIS):  - NOT IDENTIFIED  3. ADDITIONAL FINDINGS:  a. DENSE FIBROSIS WITH FIBROADENOMATOSIS  b. COLUMNAR CELL CHANGES AND FOCAL USUAL DUCTAL HYPERPLASIA, WITHOUT ATYPIA  4. ADDITIONAL STUDIES:  a. ESTROGEN RECEPTOR: POSITIVE (%)  b. PROGESTERONE RECEPTOR: WEAKLY POSITIVE (10%)  c. HER-2/RUBENS: NEGATIVE (0+)  d. E-CADHERIN: NEGATIVE; CONSISTENT WITH LOBULAR CARCINOMA  Electronically signed by Edith Bolaños MD on 6/10/2025 at 1429 CDT

## 2025-06-11 ENCOUNTER — TELEPHONE (OUTPATIENT)
Dept: FAMILY MEDICINE | Facility: CLINIC | Age: 70
End: 2025-06-11
Payer: COMMERCIAL

## 2025-06-11 ENCOUNTER — MYC MEDICAL ADVICE (OUTPATIENT)
Dept: FAMILY MEDICINE | Facility: CLINIC | Age: 70
End: 2025-06-11
Payer: COMMERCIAL

## 2025-06-11 ENCOUNTER — PATIENT OUTREACH (OUTPATIENT)
Dept: ONCOLOGY | Facility: CLINIC | Age: 70
End: 2025-06-11
Payer: COMMERCIAL

## 2025-06-11 DIAGNOSIS — C50.512 MALIGNANT NEOPLASM OF LOWER-OUTER QUADRANT OF LEFT BREAST OF FEMALE, ESTROGEN RECEPTOR NEGATIVE (H): Primary | ICD-10-CM

## 2025-06-11 DIAGNOSIS — E55.9 VITAMIN D DEFICIENCY: Primary | ICD-10-CM

## 2025-06-11 DIAGNOSIS — Z17.1 MALIGNANT NEOPLASM OF LOWER-OUTER QUADRANT OF LEFT BREAST OF FEMALE, ESTROGEN RECEPTOR NEGATIVE (H): Primary | ICD-10-CM

## 2025-06-11 DIAGNOSIS — Z13.6 CARDIOVASCULAR SCREENING; LDL GOAL LESS THAN 130: ICD-10-CM

## 2025-06-11 NOTE — PROGRESS NOTES
New Patient Oncology Nurse Navigator Note     Referring provider: Jeramy Conti MD Ilana Farb NP      Referring Clinic/Organization: [facility]     Referred to (specialty:) Medical Oncology and Cancer Surgery      Date Referral Received: June 11, 2025     Evaluation for:  C50.912 (ICD-10-CM) - Malignant neoplasm of left breast (H)     Clinical History (per Nurse review of records provided):      Malorie Gill  is a 70 year old female who presented for screening mammogram on 5/28/25 revealing a large heavily calcified benign-appearing mass in the lateral aspect of the left breast. There are 2 other round masses in the left breast that need further evaluation with ultrasound. One is in the upper outer left breast posteriorly and the other is in the lower inner left breast posteriorly. Left breast ultrasound followed on 6/5. In the 1:00 position 5 cm from nipple there is a relatively circumscribed hypoechoic lesion. Its posterior margin is irregular and lobulated. This measures 9 x 6 x 10 mm. Tissue diagnosis is recommended.  In the 7:00 position 3 cm from nipple there is a 6 x 5 x 7 mm ill-defined hypoechoic mass. Tissue diagnosis is recommended.  In the 10:00 position approximately 5 cm from nipple there is an ill-defined lobulated heterogeneous hypoechoic mass measuring 15 x 10 x 18 mm. Tissue diagnosis is recommended.     6/6/25 - Case: [id number]                                   A) LEFT BREAST, 7:00, 3 CM FN, ULTRASOUND-GUIDED CORE BIOPSY:  INVASIVE LOBULAR CARCINOMA:  GRADE: SLADE GRADE I (OF III) (TUBULES 3; NUCLEI 1; MITOSES 1)  SCORE: 5 (OF 9)  LENGTH: 7 MM  DUCTAL CARCINOMA IN SITU (DCIS):  - NOT IDENTIFIED  ADDITIONAL FINDINGS: DENSE STROMAL FIBROSIS  4.    ADDITIONAL STUDIES:  ESTROGEN RECEPTOR: POSITIVE (%)  PROGESTERONE RECEPTOR: POSITIVE (11-20%)  HER-2/RUBENS: NEGATIVE (1+)   E-CADHERIN: NEGATIVE; CONSISTENT WITH LOBULAR CARCINOMA    B) LEFT BREAST, 1:00, 5 CM FN,  "ULTRASOUND-GUIDED CORE BIOPSY:  INVASIVE DUCTAL CARCINOMA WITH TUBULAR FEATURES:  GRADE: SLADE GRADE I (of III) (TUBULES 1; NUCLEI 1; MITOSES 1)   SCORE: 3 (OF 9)  LENGTH: 1 MM  ATYPICAL DUCTAL HYPERPLASIA; QUANTITATIVELY INSUFFICIENT FOR DIAGNOSIS        OF LOW-GRADE DCIS  ADDITIONAL FINDINGS: CARCINOMA INVOLVING A FIBROADENOMA  4.    ADDITIONAL STUDIES:  ESTROGEN RECEPTOR: POSITIVE (%)  PROGESTERONE RECEPTOR: POSITIVE (81-90%)  HER-2/RUBENS: NEGATIVE (0+)  P63: NEGATIVE IN FOCUS OF INVASION  SMOOTH MUSCLE MYOSIN: NEGATIVE IN FOCUS OF INVASION  C) LEFT BREAST, 8:00, 5 CM FN, ULTRASOUND-GUIDED CORE BIOPSY:  INVASIVE LOBULAR CARCINOMA:  GRADE: SLADE GRADE I (of III) (TUBULES 3; NUCLEI 1; MITOSES 1)  SCORE: 5 (OF 9)  LENGTH: 11 MM  DUCTAL CARCINOMA IN SITU (DCIS):  - NOT IDENTIFIED  ADDITIONAL FINDINGS:                 a.  DENSE FIBROSIS WITH FIBROADENOMATOSIS                b.  COLUMNAR CELL CHANGES AND FOCAL USUAL DUCTAL HYPERPLASIA, WITHOUT ATYPIA  4.    ADDITIONAL STUDIES:  ESTROGEN RECEPTOR: POSITIVE (%)  PROGESTERONE RECEPTOR: WEAKLY POSITIVE (10%)  HER-2/RUBENS: NEGATIVE (0+)  E-CADHERIN: NEGATIVE; CONSISTENT WITH LOBULAR CARCINOMA     In 2017 Dr. Pham documented \"Has a large breast mass lateral left breast that she states has not changed over 'many years'; she declines a mammogram or sonogram\"    In 2013 Dr. Conti documented \"BREAST: normal without masses, tenderness or nipple discharge and no palpable axillary masses or adenopathy \"     Patient resdies in [place], [place].    6/11 1314 - Telephoned patient at [phone]. No answer and no voice mail. Then called mobile at [phone] and left voice message for patient requesting call back. Generically explained my role and purpose for the call.     6/11 1502 - Pat and her spouse David returned the call and we reviewed the pathology report. They did have more scientific questions better addressed by pathologist and will post those to " providers. 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 She specifically requested to see Dr. Rice for surgery consult. She would like to see next available medical oncology at [place] rather than waiting for post-op. Writer received referral, reviewed for appropriate plan, and referral transferred to New Patient Scheduling for completion.    They had done research about endocrine therapy for hormone positive cancer and writer did enforce that is typically utilized after other interventions such as surgery, chemotherapy, and radiation therapy, but is case determined.    Records Location: See Bookmarked material

## 2025-06-11 NOTE — TELEPHONE ENCOUNTER
Pat returned my call.  verified.     Patient notified that pathology results from LEFT breast biopsies performed on 2025 revealed:       Final Diagnosis   A) LEFT BREAST, 7:00, 3 CM FN, ULTRASOUND-GUIDED CORE BIOPSY:  INVASIVE LOBULAR CARCINOMA:  GRADE: SLADE GRADE I (OF III) (TUBULES 3; NUCLEI 1; MITOSES 1)  SCORE: 5 (OF 9)  LENGTH: 7 MM  DUCTAL CARCINOMA IN SITU (DCIS):  - NOT IDENTIFIED  ADDITIONAL FINDINGS: DENSE STROMAL FIBROSIS  4.    ADDITIONAL STUDIES:  ESTROGEN RECEPTOR: POSITIVE (%)  PROGESTERONE RECEPTOR: POSITIVE (11-20%)  HER-2/RUBENS: NEGATIVE (1+)   E-CADHERIN: NEGATIVE; CONSISTENT WITH LOBULAR CARCINOMA        B) LEFT BREAST, 1:00, 5 CM FN, ULTRASOUND-GUIDED CORE BIOPSY:  INVASIVE DUCTAL CARCINOMA WITH TUBULAR FEATURES:  GRADE: SLADE GRADE I (of III) (TUBULES 1; NUCLEI 1; MITOSES 1)   SCORE: 3 (OF 9)  LENGTH: 1 MM  ATYPICAL DUCTAL HYPERPLASIA; QUANTITATIVELY INSUFFICIENT FOR DIAGNOSIS        OF LOW-GRADE DCIS  ADDITIONAL FINDINGS: CARCINOMA INVOLVING A FIBROADENOMA  4.    ADDITIONAL STUDIES:  ESTROGEN RECEPTOR: POSITIVE (%)  PROGESTERONE RECEPTOR: POSITIVE (81-90%)  HER-2/RUBENS: NEGATIVE (0+)  P63: NEGATIVE IN FOCUS OF INVASION  SMOOTH MUSCLE MYOSIN: NEGATIVE IN FOCUS OF INVASION        C) LEFT BREAST, 8:00, 5 CM FN, ULTRASOUND-GUIDED CORE BIOPSY:  INVASIVE LOBULAR CARCINOMA:  GRADE: SLADE GRADE I (of III) (TUBULES 3; NUCLEI 1; MITOSES 1)  SCORE: 5 (OF 9)  LENGTH: 11 MM  DUCTAL CARCINOMA IN SITU (DCIS):  - NOT IDENTIFIED  ADDITIONAL FINDINGS:                 a.  DENSE FIBROSIS WITH FIBROADENOMATOSIS                b.  COLUMNAR CELL CHANGES AND FOCAL USUAL DUCTAL HYPERPLASIA, WITHOUT ATYPIA  4.    ADDITIONAL STUDIES:  ESTROGEN RECEPTOR: POSITIVE (%)  PROGESTERONE RECEPTOR: WEAKLY POSITIVE (10%)  HER-2/RUBENS: NEGATIVE (0+)  E-CADHERIN: NEGATIVE; CONSISTENT WITH LOBULAR CARCINOMA         Electronically signed by Edith Boalños MD on 6/10/2025 at 1429 CDT        Per  Breast Center Radiologist, Dr. Blake Hernandez, results are concordant with imaging findings.     Recommendation: Surgical and Oncology Consults.    I will place an order for referrals for Surgical Consultation and Medical Oncology Consultations. A Cancer Care Team  will be reaching out to patient within the next 24 - 48 hours to assist her in getting consults scheduled.  Patient has scheduling phone number if needed.  Patient states no problems with biopsy site.      Ordering provider, Tawny Storey NP , has been notified of the results and recommendations for follow up.  I will forward this note, along with the pathology results.  All patient's questions answered appropriately and thoroughly. Patient verbalized understanding.       Both parties in agreement of above plan.      Vangie Hernandes RN BSN  Procedure Nurse  [facility]  [phone]

## 2025-06-11 NOTE — TELEPHONE ENCOUNTER
Order/Referral Request    Who is requesting: Patient    Orders being requested: Labs for: CBC, Platelets with diff, Metabolic panel, UA, lactate dehydrogenase, lipids, magnesium, b12, b3    Reason service is needed/diagnosis: Part of an annual and she would like a call back to discuss why she wants these tests ran.    When are orders needed by: asap    Has this been discussed with Provider: No    Does patient have a preference on a Group/Provider/Facility? [facility]    Does patient have an appointment scheduled?: No    Where to send orders: Place orders within Epic    Could we send this information to you in [facility] or would you prefer to receive a phone call?:   Patient would prefer a phone call   Okay to leave a detailed message?: Yes at Cell number on file:    Telephone Information:   Mobile [phone]

## 2025-06-12 ENCOUNTER — TELEPHONE (OUTPATIENT)
Dept: FAMILY MEDICINE | Facility: CLINIC | Age: 70
End: 2025-06-12

## 2025-06-12 ENCOUNTER — LAB (OUTPATIENT)
Dept: LAB | Facility: CLINIC | Age: 70
End: 2025-06-12
Payer: COMMERCIAL

## 2025-06-12 ENCOUNTER — RESULTS FOLLOW-UP (OUTPATIENT)
Dept: FAMILY MEDICINE | Facility: CLINIC | Age: 70
End: 2025-06-12

## 2025-06-12 DIAGNOSIS — C50.512 MALIGNANT NEOPLASM OF LOWER-OUTER QUADRANT OF LEFT BREAST OF FEMALE, ESTROGEN RECEPTOR NEGATIVE (H): ICD-10-CM

## 2025-06-12 DIAGNOSIS — Z13.6 CARDIOVASCULAR SCREENING; LDL GOAL LESS THAN 130: ICD-10-CM

## 2025-06-12 DIAGNOSIS — Z17.1 MALIGNANT NEOPLASM OF LOWER-OUTER QUADRANT OF LEFT BREAST OF FEMALE, ESTROGEN RECEPTOR NEGATIVE (H): ICD-10-CM

## 2025-06-12 DIAGNOSIS — E55.9 VITAMIN D DEFICIENCY: ICD-10-CM

## 2025-06-12 LAB
ALBUMIN SERPL BCG-MCNC: 4.6 G/DL (ref 3.5–5.2)
ALBUMIN UR-MCNC: NEGATIVE MG/DL
ALP SERPL-CCNC: 71 U/L (ref 40–150)
ALT SERPL W P-5'-P-CCNC: 18 U/L (ref 0–50)
ANION GAP SERPL CALCULATED.3IONS-SCNC: 11 MMOL/L (ref 7–15)
APPEARANCE UR: CLEAR
AST SERPL W P-5'-P-CCNC: 27 U/L (ref 0–45)
BASOPHILS # BLD AUTO: 0 10E3/UL (ref 0–0.2)
BASOPHILS NFR BLD AUTO: 1 %
BILIRUB SERPL-MCNC: 0.7 MG/DL
BILIRUB UR QL STRIP: NEGATIVE
BUN SERPL-MCNC: 12.1 MG/DL (ref 8–23)
CALCIUM SERPL-MCNC: 9.5 MG/DL (ref 8.8–10.4)
CHLORIDE SERPL-SCNC: 99 MMOL/L (ref 98–107)
CHOLEST SERPL-MCNC: 175 MG/DL
COLOR UR AUTO: YELLOW
CREAT SERPL-MCNC: 0.9 MG/DL (ref 0.51–0.95)
EGFRCR SERPLBLD CKD-EPI 2021: 68 ML/MIN/1.73M2
EOSINOPHIL # BLD AUTO: 0.1 10E3/UL (ref 0–0.7)
EOSINOPHIL NFR BLD AUTO: 1 %
ERYTHROCYTE [DISTWIDTH] IN BLOOD BY AUTOMATED COUNT: 12.6 % (ref 10–15)
FASTING STATUS PATIENT QL REPORTED: YES
FASTING STATUS PATIENT QL REPORTED: YES
GLUCOSE SERPL-MCNC: 126 MG/DL (ref 70–99)
GLUCOSE UR STRIP-MCNC: NEGATIVE MG/DL
HCO3 SERPL-SCNC: 27 MMOL/L (ref 22–29)
HCT VFR BLD AUTO: 41.9 % (ref 35–47)
HDLC SERPL-MCNC: 82 MG/DL
HGB BLD-MCNC: 13.9 G/DL (ref 11.7–15.7)
HGB UR QL STRIP: ABNORMAL
IMM GRANULOCYTES # BLD: 0 10E3/UL
IMM GRANULOCYTES NFR BLD: 0 %
KETONES UR STRIP-MCNC: NEGATIVE MG/DL
LDH SERPL L TO P-CCNC: 217 U/L (ref 0–250)
LDLC SERPL CALC-MCNC: 81 MG/DL
LEUKOCYTE ESTERASE UR QL STRIP: NEGATIVE
LYMPHOCYTES # BLD AUTO: 1 10E3/UL (ref 0.8–5.3)
LYMPHOCYTES NFR BLD AUTO: 18 %
MAGNESIUM SERPL-MCNC: 2 MG/DL (ref 1.7–2.3)
MCH RBC QN AUTO: 30.9 PG (ref 26.5–33)
MCHC RBC AUTO-ENTMCNC: 33.2 G/DL (ref 31.5–36.5)
MCV RBC AUTO: 93 FL (ref 78–100)
MONOCYTES # BLD AUTO: 0.4 10E3/UL (ref 0–1.3)
MONOCYTES NFR BLD AUTO: 7 %
NEUTROPHILS # BLD AUTO: 4.4 10E3/UL (ref 1.6–8.3)
NEUTROPHILS NFR BLD AUTO: 74 %
NITRATE UR QL: NEGATIVE
NONHDLC SERPL-MCNC: 93 MG/DL
PH UR STRIP: 6.5 [PH] (ref 5–7)
PLATELET # BLD AUTO: 262 10E3/UL (ref 150–450)
POTASSIUM SERPL-SCNC: 4.2 MMOL/L (ref 3.4–5.3)
PROT SERPL-MCNC: 7.2 G/DL (ref 6.4–8.3)
RBC # BLD AUTO: 4.5 10E6/UL (ref 3.8–5.2)
RBC #/AREA URNS AUTO: NORMAL /HPF
SODIUM SERPL-SCNC: 137 MMOL/L (ref 135–145)
SP GR UR STRIP: 1.01 (ref 1–1.03)
TRIGL SERPL-MCNC: 61 MG/DL
UROBILINOGEN UR STRIP-ACNC: 0.2 E.U./DL
VIT B12 SERPL-MCNC: 680 PG/ML (ref 232–1245)
VIT D+METAB SERPL-MCNC: 28 NG/ML (ref 20–50)
WBC # BLD AUTO: 6 10E3/UL (ref 4–11)
WBC #/AREA URNS AUTO: NORMAL /HPF

## 2025-06-12 NOTE — TELEPHONE ENCOUNTER
Patient called to make sure all her labs were so she can schedule  lab appointment, see yesterday's telephone encounter, this is completed and patient notified.      HELENA Vo

## 2025-06-12 NOTE — TELEPHONE ENCOUNTER
Reason for Call:  Appointment Request    Patient requesting this type of appt:  fasting lab    Requested provider: lab    Reason patient unable to be scheduled: Not within requested timeframe    When does patient want to be seen/preferred time: 1-2 days    Comments: labs - fasting wants done asap    Could we send this information to you in [facility] or would you prefer to receive a phone call?:   Patient would prefer a phone call   Okay to leave a detailed message?: Yes at Cell number on file:    Telephone Information:   Mobile [phone]       Call taken on 6/12/2025 at 8:30 AM by Cheryl Carrillo

## 2025-06-12 NOTE — TELEPHONE ENCOUNTER
RECORDS STATUS - BREAST      RECORDS REQUESTED FROM:    DIAGNOSIS:    NOTES DETAILS STATUS   OFFICE NOTE from referring provider [facility] Dr. Jeramy Conti   OPERATIVE REPORT Epic 6/6/25: US Breast Bx   MEDICATION LIST [facility]    LABS     PATHOLOGY REPORTS  (Tissue diagnosis, Stage, ER/HI percentage positive and intensity of staining, HER2 IHC, FISH, and all biopsies from breast and any distant metastasis)                 Report in Epic 6/6/25: [id number]    IMAGING (NEED IMAGES & REPORT)     MAMMO PACS 6/6/25, 5/28/25   ULTRASOUND PACS 6/6/25: US Breast Bx  6/5/25: US Breast

## 2025-06-16 PROBLEM — C50.512 MALIGNANT NEOPLASM OF LOWER-OUTER QUADRANT OF LEFT BREAST OF FEMALE, ESTROGEN RECEPTOR NEGATIVE (H): Status: ACTIVE | Noted: 2025-06-16

## 2025-06-16 PROBLEM — Z17.1 MALIGNANT NEOPLASM OF LOWER-OUTER QUADRANT OF LEFT BREAST OF FEMALE, ESTROGEN RECEPTOR NEGATIVE (H): Status: ACTIVE | Noted: 2025-06-16

## 2025-06-17 ENCOUNTER — OFFICE VISIT (OUTPATIENT)
Dept: SURGERY | Facility: CLINIC | Age: 70
End: 2025-06-17
Attending: FAMILY MEDICINE
Payer: COMMERCIAL

## 2025-06-17 ENCOUNTER — PATIENT OUTREACH (OUTPATIENT)
Dept: ONCOLOGY | Facility: CLINIC | Age: 70
End: 2025-06-17

## 2025-06-17 VITALS
TEMPERATURE: 98.5 F | WEIGHT: 122.8 LBS | HEART RATE: 91 BPM | DIASTOLIC BLOOD PRESSURE: 80 MMHG | SYSTOLIC BLOOD PRESSURE: 145 MMHG | BODY MASS INDEX: 19.73 KG/M2 | HEIGHT: 66 IN

## 2025-06-17 DIAGNOSIS — C50.912 INVASIVE DUCTAL CARCINOMA OF BREAST, FEMALE, LEFT (H): ICD-10-CM

## 2025-06-17 DIAGNOSIS — Z17.1 MALIGNANT NEOPLASM OF LOWER-OUTER QUADRANT OF LEFT BREAST OF FEMALE, ESTROGEN RECEPTOR NEGATIVE (H): Primary | ICD-10-CM

## 2025-06-17 DIAGNOSIS — I10 PRIMARY HYPERTENSION: ICD-10-CM

## 2025-06-17 DIAGNOSIS — C50.512 MALIGNANT NEOPLASM OF LOWER-OUTER QUADRANT OF LEFT BREAST OF FEMALE, ESTROGEN RECEPTOR NEGATIVE (H): Primary | ICD-10-CM

## 2025-06-17 PROCEDURE — 3077F SYST BP >= 140 MM HG: CPT | Performed by: SURGERY

## 2025-06-17 PROCEDURE — 99205 OFFICE O/P NEW HI 60 MIN: CPT | Performed by: SURGERY

## 2025-06-17 PROCEDURE — 3079F DIAST BP 80-89 MM HG: CPT | Performed by: SURGERY

## 2025-06-17 RX ORDER — B-COMPLEX WITH VITAMIN C
50 TABLET ORAL
COMMUNITY

## 2025-06-17 NOTE — PROGRESS NOTES
Assessment & Plan   Problem List Items Addressed This Visit          Other    Malignant neoplasm of lower-outer quadrant of left breast of female, estrogen receptor negative (H) - Primary     Other Visit Diagnoses         Malignant neoplasm of left breast (H)               69 yo with multicentric left breast cancer - 1 oclock 5cm FNAC IDC ([id number]) ER/MI (+); HER2(-); 7 oclock 3cm FNAC ILC ([id number]),  MI/ER (+); HER2(-); 10 oclock 5cm FNAC ILC [id number] MI/ER (+);    I reviewed the imaging, diagnosis, staging, and management (including surgery, chemotherapy, radiation therapy, and endocrine therapy) of mutlicentric breast cancer with Malorie Gill. A copy of the biopsy pathology report was also provided.    I explained the surgical options: breast conservative therapy vs simple mastectomy with sentinel lymph node biopsy.    The treatment for resectable breast cancer is surgical resection, in the form of either breast conservation (segmental mastectomy plus radiation) or mastectomy.  We reviewed that the two strategies are equivalent in terms of overall survival.  The advantages and disadvantages of each were discussed.       Malorie Gill IS NOT a candidate for breast conservation therapy.      Malorie Gill is a candidate for left simple mastectomy due to her multicentric disease.  The risks of a simple mastectomy were discussed with the patient, including the risks of bleeding, wound infection, wound dehiscence, skin flap necrosis, poor cosmetic outcomes with skin folds, decreased shoulder range of motion, chest wall numbness, etc. The option of having immediate versus delayed reconstruction was also discussed.  Malorie Gill was not interested in this; a Plastic Surgery consultation was not offered and will not be arranged. Depending on the margin and mirian status post-mastectomy, radiation may be necessary.     I advised Ms. Gill that lymph node biopsy is recommended whenever we are dealing with  invasive breast cancer and described the procedure for sentinel lymph node biopsy.  We talked about the risk for lymphedema (7%) which is small with removal of only a few nodes.    We talked about level 1 and level 2 axillary lymph node dissection; risk for lymphedema is up to 20-30%%.  We talked about the indications for the axillary dissection.  I reviewed the data regarding lumpectomy and radiation vs mastectomy that shows that the local recurrence risk is slightly higher for lumpectomy and radiation vs mastectomy (5-10% vs. 1-2%), but the survival at 20 years is the same.     There is a 1-2% chance of patients whose sentinel lymph nodes do not map despite dual tracer (radiocolloid and lymphazurin). Should this be the case, we discussed that I would abort the sentinel lymph nodes portion at the index procedure.       Finally, we reviewed that breast cancer care is a team-approach, medical oncology and radiation oncology referrals will also be made after surgery to discuss adjuvant systemic/endocrine therapy and radiation therapy.  The decision regarding adjuvant chemotherapy will be made after surgery.     As of right now, Ms. Gill is not interested in surgery as of yet.  She's wanted to gather more information prior to making an informed decision.  We discussed the goal of diagnosis to removal is 60 days to pretend upstaging.  Patient is understanding of this.    PLAN:   Patient to notify me if she wants to go forward with surgery and I'll place my case request.     Face to Face/patient Contact total time: 60 minutes  Pre Charting time: 10 minutes; Post charting time, communication and other activities: 10 minutes;   Total time:  80 minutes      Subjective   Kristen is a 70 year old, presenting for the following health issues:  Consult (Left breast Ca)    Found this as she's still having hot flashes  Was going through a [place] [facility] to get help with the hot flashes; and was recommended to have a mammogram.  "  That's when this was found.    Last mammogram was decades ago.   Never had any done to her breasts  Never MI; no CVA; not on blood thinners  No changes to her breast; might have gotten a \"sharp jab\" on the left breast  No discharge of nipple; no thickening; no palpable mass.       BREAST-SPECIFIC HISTORY:  Prior breast biopsies: none  Prior breast surgeries: none  Prior radiation history: none  Hormone replacement therapy: early 40s - some hormones for bleeding (1-2 months)  Bra size: 32 A  Dominant hand: right     GYN HISTORY:  [id number]  Age at 1st pregnancy: 37yo  Age at menarche: 13 yo  Breastfeeding history: yes  Menopausal? post     Reproductive PSH includes: tubal ligation; left oopherectomy    Cancer history in self: none      FAMILY HISTORY:  Breast ca: none  Ovarian ca: none  Pancreatic ca: none  Gastric ca: none   Melanoma: sister  Colon ca: none  Other cancer: brain (Dad); skin cancer (nonmelanoma, mom); oral cancer (brother)               Review of Systems  Constitutional, neuro, ENT, endocrine, pulmonary, cardiac, gastrointestinal, genitourinary, musculoskeletal, integument and psychiatric systems are negative, except as otherwise noted.      Objective    There were no vitals taken for this visit.  There is no height or weight on file to calculate BMI.  Physical Exam  Vitals reviewed.   HENT:      Mouth/Throat:      Mouth: Mucous membranes are moist.   Pulmonary:      Effort: Pulmonary effort is normal.   Chest:   Breasts:     Right: Normal. No swelling, bleeding, inverted nipple or mass.      Left: Mass present. No swelling, skin change or tenderness.          Comments: Redness indicates area of palpable mass.  Skin ecchymosis from biopsy.  Skin not involved but very superficial.    Abdominal:      Palpations: Abdomen is soft.   Lymphadenopathy:      Upper Body:      Right upper body: No supraclavicular, axillary or pectoral adenopathy.      Left upper body: No supraclavicular, axillary or pectoral " adenopathy.   Skin:     General: Skin is warm.      Capillary Refill: Capillary refill takes less than 2 seconds.   Neurological:      Mental Status: She is alert.        EXAM: US BREAST LEFT LIMITED 1-3 QUADRANTS, 6/5/2025 3:18 PM     COMPARISONS: 5/28/2025     HISTORY: 2 asymmetries seen by mammography in the left breast.     FINDINGS: In the 1:00 position 5 cm from nipple there is a relatively  circumscribed hypoechoic lesion. Its posterior margin is irregular and  lobulated. This measures 9 x 6 x 10 mm. Tissue diagnosis is  recommended.     In the 7:00 position 3 cm from nipple there is a 6 x 5 x 7 mm  ill-defined hypoechoic mass. Tissue diagnosis is recommended.     In the 10:00 position approximately 5 cm from nipple there is an  ill-defined lobulated heterogeneous hypoechoic mass measuring 15 x 10  x 18 mm. Tissue diagnosis is recommended.                                                                      IMPRESSION:  1. The 2 abnormalities seen on mammography correlated with 2 of 3  suspicious masses described above. Given the heterogeneity of  characteristics and distance between these lesions, biopsy is  recommended for all 3.      BI-RADS CATEGORY: 4 - Suspicious.     RECOMMENDED FOLLOW-UP: Biopsy.       EXAM: MA SCREENING BILATERAL W/ PAWAN, 5/28/2025 10:37 AM     COMPARISONS: None     HISTORY: Breast cancer screening by mammogram     FINDINGS: There is a large heavily calcified benign-appearing mass in  the lateral aspect of the left breast. There are 2 other round masses  in the left breast that need further evaluation with ultrasound. One  is in the upper outer left breast posteriorly and the other is in the  lower inner left breast posteriorly.     The right breast shows no mammographic evidence for malignancy.     BREAST DENSITY: The breasts are heterogeneously dense, which may  obscure small masses.                                                                      IMPRESSION: BI-RADS CATEGORY:  0 - Incomplete - Need Additional Imaging  Evaluation.     RECOMMENDED FOLLOW-UP: Ultrasound.     ELENO COLLINS MD        Signed Electronically by: Will Rice MD

## 2025-06-17 NOTE — LETTER
6/17/2025      Malorie Gill  [address]  [facility] [address]      Dear Colleague,    Thank you for referring your patient, Malorie Gill, to the [facility]. Please see a copy of my visit note below.      Assessment & Plan  Problem List Items Addressed This Visit          Other    Malignant neoplasm of lower-outer quadrant of left breast of female, estrogen receptor negative (H) - Primary     Other Visit Diagnoses         Malignant neoplasm of left breast (H)               71 yo with multicentric left breast cancer - 1 oclock 5cm FNAC IDC ([id number]) ER/IL (+); HER2(-); 7 oclock 3cm FNAC ILC (4p3c9ed),  IL/ER (+); HER2(-); 10 oclock 5cm FNAC ILC [id number] IL/ER (+);    I reviewed the imaging, diagnosis, staging, and management (including surgery, chemotherapy, radiation therapy, and endocrine therapy) of mutlicentric breast cancer with Malorie Gill. A copy of the biopsy pathology report was also provided.    I explained the surgical options: breast conservative therapy vs simple mastectomy with sentinel lymph node biopsy.    The treatment for resectable breast cancer is surgical resection, in the form of either breast conservation (segmental mastectomy plus radiation) or mastectomy.  We reviewed that the two strategies are equivalent in terms of overall survival.  The advantages and disadvantages of each were discussed.       Malorie Gill IS NOT a candidate for breast conservation therapy.      Malorie Gill is a candidate for left simple mastectomy due to her multicentric disease.  The risks of a simple mastectomy were discussed with the patient, including the risks of bleeding, wound infection, wound dehiscence, skin flap necrosis, poor cosmetic outcomes with skin folds, decreased shoulder range of motion, chest wall numbness, etc. The option of having immediate versus delayed reconstruction was also discussed.  Malorie Gill was not interested in this; a Plastic Surgery  consultation was not offered and will not be arranged. Depending on the margin and mirian status post-mastectomy, radiation may be necessary.     I advised Ms. Gill that lymph node biopsy is recommended whenever we are dealing with invasive breast cancer and described the procedure for sentinel lymph node biopsy.  We talked about the risk for lymphedema (7%) which is small with removal of only a few nodes.    We talked about level 1 and level 2 axillary lymph node dissection; risk for lymphedema is up to 20-30%%.  We talked about the indications for the axillary dissection.  I reviewed the data regarding lumpectomy and radiation vs mastectomy that shows that the local recurrence risk is slightly higher for lumpectomy and radiation vs mastectomy (5-10% vs. 1-2%), but the survival at 20 years is the same.     There is a 1-2% chance of patients whose sentinel lymph nodes do not map despite dual tracer (radiocolloid and lymphazurin). Should this be the case, we discussed that I would abort the sentinel lymph nodes portion at the index procedure.       Finally, we reviewed that breast cancer care is a team-approach, medical oncology and radiation oncology referrals will also be made after surgery to discuss adjuvant systemic/endocrine therapy and radiation therapy.  The decision regarding adjuvant chemotherapy will be made after surgery.     As of right now, Ms. Gill is not interested in surgery as of yet.  She's wanted to gather more information prior to making an informed decision.  We discussed the goal of diagnosis to removal is 60 days to pretend upstaging.  Patient is understanding of this.    PLAN:   Patient to notify me if she wants to go forward with surgery and I'll place my case request.     Face to Face/patient Contact total time: 60 minutes  Pre Charting time: 10 minutes; Post charting time, communication and other activities: 10 minutes;   Total time:  80 minutes      Subjective  Pat is a 70 year old,  "presenting for the following health issues:  Consult (Left breast Ca)    Found this as she's still having hot flashes  [facility] through [facility] [place] [facility] to get help with the hot flashes; and was recommended to have a mammogram.   That's when this was found.    Last mammogram was decades ago.   Never had any done to her breasts  Never MI; no CVA; not on blood thinners  No changes to her breast; might have gotten a \"sharp jab\" on the left breast  No discharge of nipple; no thickening; no palpable mass.       BREAST-SPECIFIC HISTORY:  Prior breast biopsies: none  Prior breast surgeries: none  Prior radiation history: none  Hormone replacement therapy: early 40s - some hormones for bleeding (1-2 months)  Bra size: 32 A  Dominant hand: right     GYN HISTORY:  [id number]  Age at 1st pregnancy: 37yo  Age at menarche: 15 yo  Breastfeeding history: yes  Menopausal? post     Reproductive PSH includes: tubal ligation; left oopherectomy    Cancer history in self: none      FAMILY HISTORY:  Breast ca: none  Ovarian ca: none  Pancreatic ca: none  Gastric ca: none   Melanoma: sister  Colon ca: none  Other cancer: brain (Dad); skin cancer (nonmelanoma, mom); oral cancer (brother)               Review of Systems  Constitutional, neuro, ENT, endocrine, pulmonary, cardiac, gastrointestinal, genitourinary, musculoskeletal, integument and psychiatric systems are negative, except as otherwise noted.      Objective   There were no vitals taken for this visit.  There is no height or weight on file to calculate BMI.  Physical Exam  Vitals reviewed.   HENT:      Mouth/Throat:      Mouth: Mucous membranes are moist.   Pulmonary:      Effort: Pulmonary effort is normal.   Chest:   Breasts:     Right: Normal. No swelling, bleeding, inverted nipple or mass.      Left: Mass present. No swelling, skin change or tenderness.          Comments: Redness indicates area of palpable mass.  Skin ecchymosis from biopsy.  Skin not involved but very " superficial.    Abdominal:      Palpations: Abdomen is soft.   Lymphadenopathy:      Upper Body:      Right upper body: No supraclavicular, axillary or pectoral adenopathy.      Left upper body: No supraclavicular, axillary or pectoral adenopathy.   Skin:     General: Skin is warm.      Capillary Refill: Capillary refill takes less than 2 seconds.   Neurological:      Mental Status: She is alert.        EXAM: US BREAST LEFT LIMITED 1-3 QUADRANTS, 6/5/2025 3:18 PM     COMPARISONS: 5/28/2025     HISTORY: 2 asymmetries seen by mammography in the left breast.     FINDINGS: In the 1:00 position 5 cm from nipple there is a relatively  circumscribed hypoechoic lesion. Its posterior margin is irregular and  lobulated. This measures 9 x 6 x 10 mm. Tissue diagnosis is  recommended.     In the 7:00 position 3 cm from nipple there is a 6 x 5 x 7 mm  ill-defined hypoechoic mass. Tissue diagnosis is recommended.     In the 10:00 position approximately 5 cm from nipple there is an  ill-defined lobulated heterogeneous hypoechoic mass measuring 15 x 10  x 18 mm. Tissue diagnosis is recommended.                                                                      IMPRESSION:  1. The 2 abnormalities seen on mammography correlated with 2 of 3  suspicious masses described above. Given the heterogeneity of  characteristics and distance between these lesions, biopsy is  recommended for all 3.      BI-RADS CATEGORY: 4 - Suspicious.     RECOMMENDED FOLLOW-UP: Biopsy.       EXAM: MA SCREENING BILATERAL W/ PAWAN, 5/28/2025 10:37 AM     COMPARISONS: None     HISTORY: Breast cancer screening by mammogram     FINDINGS: There is a large heavily calcified benign-appearing mass in  the lateral aspect of the left breast. There are 2 other round masses  in the left breast that need further evaluation with ultrasound. One  is in the upper outer left breast posteriorly and the other is in the  lower inner left breast posteriorly.     The right breast  shows no mammographic evidence for malignancy.     BREAST DENSITY: The breasts are heterogeneously dense, which may  obscure small masses.                                                                      IMPRESSION: BI-RADS CATEGORY: 0 - Incomplete - Need Additional Imaging  Evaluation.     RECOMMENDED FOLLOW-UP: Ultrasound.     ELENO COLLINS MD        Signed Electronically by: Will Rice MD      Again, thank you for allowing me to participate in the care of your patient.        Sincerely,        Will Rice MD    Electronically signed

## 2025-06-17 NOTE — NURSING NOTE
"Initial BP (!) 145/80 (BP Location: Left arm, Patient Position: Sitting, Cuff Size: Adult Regular)   Pulse 91   Temp 98.5  F (36.9  C) (Tympanic)   Ht 1.676 m (5' 5.98\")   Wt 55.7 kg (122 lb 12.8 oz)   BMI 19.83 kg/m   Estimated body mass index is 19.83 kg/m  as calculated from the following:    Height as of this encounter: 1.676 m (5' 5.98\").    Weight as of this encounter: 55.7 kg (122 lb 12.8 oz). .  Ibeth Leung MA    "

## 2025-06-22 LAB
NIACIN SERPL-MCNC: NORMAL NG/ML
NICOTINAMIDE SERPL-MCNC: 12 NG/ML
NICOTINURATE SERPL-MCNC: NORMAL NG/ML

## 2025-06-23 ENCOUNTER — TRANSFERRED RECORDS (OUTPATIENT)
Dept: HEALTH INFORMATION MANAGEMENT | Facility: CLINIC | Age: 70
End: 2025-06-23
Payer: COMMERCIAL

## 2025-06-26 ENCOUNTER — PRE VISIT (OUTPATIENT)
Dept: ONCOLOGY | Facility: CLINIC | Age: 70
End: 2025-06-26
Payer: COMMERCIAL

## 2025-07-02 ENCOUNTER — TELEPHONE (OUTPATIENT)
Dept: FAMILY MEDICINE | Facility: CLINIC | Age: 70
End: 2025-07-02
Payer: COMMERCIAL

## 2025-07-02 DIAGNOSIS — Z51.81 ENCOUNTER FOR THERAPEUTIC DRUG MONITORING: ICD-10-CM

## 2025-07-02 DIAGNOSIS — Z17.1 MALIGNANT NEOPLASM OF LOWER-OUTER QUADRANT OF LEFT BREAST OF FEMALE, ESTROGEN RECEPTOR NEGATIVE (H): Primary | ICD-10-CM

## 2025-07-02 DIAGNOSIS — E55.9 VITAMIN D DEFICIENCY: ICD-10-CM

## 2025-07-02 DIAGNOSIS — E03.2 HYPOTHYROIDISM DUE TO MEDICATION: ICD-10-CM

## 2025-07-02 DIAGNOSIS — C50.512 MALIGNANT NEOPLASM OF LOWER-OUTER QUADRANT OF LEFT BREAST OF FEMALE, ESTROGEN RECEPTOR NEGATIVE (H): Primary | ICD-10-CM

## 2025-07-02 NOTE — TELEPHONE ENCOUNTER
Patient Returning Call    Reason for call:  Pt called to see if you received her my chart message? I told pt I do  not see anything that she sent. Asked her if she wanted me to take a message. Pt did not want to leave a details due to it being a lengthy message. But she really wants to talk to.    Please call pt back     Information relayed to patient:  yes    Patient has additional questions:  Yes    What are your questions/concerns:  Pt would not disclose    Who does the patient want to speak with:  Provider    Is an  needed?:  No      Could we send this information to you in [employer] or would you prefer to receive a phone call?:   Patient would prefer a phone call   Okay to leave a detailed message?: Yes at Home number on file [phone] (home)

## 2025-07-14 ENCOUNTER — HOSPITAL ENCOUNTER (OUTPATIENT)
Dept: ULTRASOUND IMAGING | Facility: CLINIC | Age: 70
Discharge: HOME | End: 2025-07-14
Attending: STUDENT IN AN ORGANIZED HEALTH CARE EDUCATION/TRAINING PROGRAM | Admitting: STUDENT IN AN ORGANIZED HEALTH CARE EDUCATION/TRAINING PROGRAM
Payer: COMMERCIAL

## 2025-07-14 DIAGNOSIS — N63.0 BREAST LUMP: ICD-10-CM

## 2025-07-14 DIAGNOSIS — C50.919 BREAST CANCER, FEMALE (H): ICD-10-CM

## 2025-07-14 DIAGNOSIS — D48.7 NEOPLASM OF UNCERTAIN BEHAVIOR OF OTHER SPECIFIED SITES: ICD-10-CM

## 2025-07-14 PROCEDURE — 76882 US LMTD JT/FCL EVL NVASC XTR: CPT | Mod: LT

## 2025-07-15 ENCOUNTER — MYC MEDICAL ADVICE (OUTPATIENT)
Dept: FAMILY MEDICINE | Facility: CLINIC | Age: 70
End: 2025-07-15
Payer: COMMERCIAL

## 2025-07-15 DIAGNOSIS — C50.512 MALIGNANT NEOPLASM OF LOWER-OUTER QUADRANT OF LEFT BREAST OF FEMALE, ESTROGEN RECEPTOR NEGATIVE (H): Primary | ICD-10-CM

## 2025-07-15 DIAGNOSIS — Z17.1 MALIGNANT NEOPLASM OF LOWER-OUTER QUADRANT OF LEFT BREAST OF FEMALE, ESTROGEN RECEPTOR NEGATIVE (H): Primary | ICD-10-CM

## 2025-07-15 DIAGNOSIS — R73.09 ELEVATED GLUCOSE: ICD-10-CM

## 2025-07-15 NOTE — TELEPHONE ENCOUNTER
Duplicate request.  Other request already forwarded to Dr Conti.  Will close this mychart request.  Tank LING, Clinic RN   [facility]

## (undated) RX ORDER — GLYCOPYRROLATE 0.2 MG/ML
INJECTION, SOLUTION INTRAMUSCULAR; INTRAVENOUS
Status: DISPENSED
Start: 2017-04-28